# Patient Record
Sex: FEMALE | Race: WHITE | Employment: FULL TIME | URBAN - METROPOLITAN AREA
[De-identification: names, ages, dates, MRNs, and addresses within clinical notes are randomized per-mention and may not be internally consistent; named-entity substitution may affect disease eponyms.]

---

## 2017-03-03 ENCOUNTER — HOSPITAL ENCOUNTER (OUTPATIENT)
Dept: MRI IMAGING | Age: 32
Discharge: HOME | End: 2017-03-03
Attending: NEUROLOGICAL SURGERY
Payer: COMMERCIAL

## 2017-03-03 DIAGNOSIS — M51.26 HNP (HERNIATED NUCLEUS PULPOSUS), LUMBAR: ICD-10-CM

## 2017-03-03 PROCEDURE — 72148 MRI LUMBAR SPINE W/O DYE: CPT

## 2017-08-17 ENCOUNTER — OP HISTORICAL/CONVERTED ENCOUNTER (OUTPATIENT)
Dept: OTHER | Age: 32
End: 2017-08-17

## 2017-09-05 ENCOUNTER — ED HISTORICAL/CONVERTED ENCOUNTER (OUTPATIENT)
Dept: OTHER | Age: 32
End: 2017-09-05

## 2017-09-06 ENCOUNTER — OP HISTORICAL/CONVERTED ENCOUNTER (OUTPATIENT)
Dept: OTHER | Age: 32
End: 2017-09-06

## 2018-07-31 ENCOUNTER — ED HISTORICAL/CONVERTED ENCOUNTER (OUTPATIENT)
Dept: OTHER | Age: 33
End: 2018-07-31

## 2018-08-04 ENCOUNTER — ED HISTORICAL/CONVERTED ENCOUNTER (OUTPATIENT)
Dept: OTHER | Age: 33
End: 2018-08-04

## 2019-01-14 ENCOUNTER — ED HISTORICAL/CONVERTED ENCOUNTER (OUTPATIENT)
Dept: OTHER | Age: 34
End: 2019-01-14

## 2019-11-07 ENCOUNTER — OP HISTORICAL/CONVERTED ENCOUNTER (OUTPATIENT)
Dept: OTHER | Age: 34
End: 2019-11-07

## 2019-11-22 ENCOUNTER — OP HISTORICAL/CONVERTED ENCOUNTER (OUTPATIENT)
Dept: OTHER | Age: 34
End: 2019-11-22

## 2019-11-29 ENCOUNTER — ED HISTORICAL/CONVERTED ENCOUNTER (OUTPATIENT)
Dept: OTHER | Age: 34
End: 2019-11-29

## 2020-01-03 ENCOUNTER — OP HISTORICAL/CONVERTED ENCOUNTER (OUTPATIENT)
Dept: OTHER | Age: 35
End: 2020-01-03

## 2020-01-15 ENCOUNTER — OP HISTORICAL/CONVERTED ENCOUNTER (OUTPATIENT)
Dept: OTHER | Age: 35
End: 2020-01-15

## 2020-05-12 ENCOUNTER — ED HISTORICAL/CONVERTED ENCOUNTER (OUTPATIENT)
Dept: OTHER | Age: 35
End: 2020-05-12

## 2020-08-26 VITALS
WEIGHT: 158 LBS | TEMPERATURE: 98.6 F | HEART RATE: 95 BPM | OXYGEN SATURATION: 96 % | HEIGHT: 63 IN | DIASTOLIC BLOOD PRESSURE: 82 MMHG | SYSTOLIC BLOOD PRESSURE: 124 MMHG | BODY MASS INDEX: 28 KG/M2

## 2020-08-26 PROBLEM — J32.0 CHRONIC MAXILLARY SINUSITIS: Status: ACTIVE | Noted: 2020-08-26

## 2020-08-26 RX ORDER — VALACYCLOVIR HYDROCHLORIDE 500 MG/1
TABLET, FILM COATED ORAL 2 TIMES DAILY
COMMUNITY

## 2020-08-26 RX ORDER — ONDANSETRON 8 MG/1
8 TABLET, ORALLY DISINTEGRATING ORAL
COMMUNITY

## 2020-08-26 RX ORDER — DIAZEPAM 5 MG/1
5 TABLET ORAL
COMMUNITY

## 2020-09-17 ENCOUNTER — OFFICE VISIT (OUTPATIENT)
Dept: PODIATRY | Age: 35
End: 2020-09-17
Payer: COMMERCIAL

## 2020-09-17 VITALS
WEIGHT: 157 LBS | TEMPERATURE: 97.8 F | BODY MASS INDEX: 27.82 KG/M2 | DIASTOLIC BLOOD PRESSURE: 104 MMHG | HEART RATE: 100 BPM | SYSTOLIC BLOOD PRESSURE: 155 MMHG | HEIGHT: 63 IN

## 2020-09-17 DIAGNOSIS — M79.671 RIGHT FOOT PAIN: Primary | ICD-10-CM

## 2020-09-17 PROCEDURE — 99213 OFFICE O/P EST LOW 20 MIN: CPT | Performed by: PODIATRIST

## 2020-09-17 NOTE — LETTER
NOTIFICATION RETURN TO WORK / SCHOOL 
 
9/17/2020 11:57 AM 
 
[facility] Soha Ramirez [address] [facility] [address] [id number] To Whom It May Concern: 
 
Soha Ramirez is currently under the care of Oliver Ramirez. She will return to work/school on: 09/28/2020 If there are questions or concerns please have the patient contact our office. Sincerely, Norma Torres DPM

## 2020-09-22 NOTE — PROGRESS NOTES
Podiatry History and Physical    Subjective:         Patient is a 38 y.o. female who is being seen as a returning patient with an acute complaint of right foot pain. Patient states she recently injured her right foot, this is not the first since her right foot surgery, and is now experiencing 5 out of 10 pain to the right foot. She states that she has had removal of hardware multiple times in the past and believes her body may be rejecting the screw that is in her right foot. She states the pain is sharp and localized to the area. She states weightbearing exacerbates the pain. She denies any local/systemic signs infection.   She denies any other pedal complaints    Past Medical History:   Diagnosis Date    Arrhythmia     TACHYCARDIA    Autoimmune disease ([address]     Chronic pain     Crohn's disease ([address]     Dizziness     Dysautonomia ([address]     GERD (gastroesophageal reflux disease)     Headache     Hyperlipidemia     Hypertriglyceridemia     Irregular heartbeat     Meningitis     MS (multiple sclerosis) (HCC)     Psychiatric disorder     ANXIETY    PUD (peptic ulcer disease)     DUODENAL    Sinus problem      Past Surgical History:   Procedure Laterality Date    HX APPENDECTOMY  1994    HX BREAST LUMPECTOMY Right     HX DILATION AND CURETTAGE  2010    ectopic pregnancy with laproscopy    [facility]    HX GYN  2011    c- section    HX HEENT      SEPTOPLASTY    HX OTHER SURGICAL      eye surgery    HX OTHER SURGICAL      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    HX SEPTOPLASTY      Repair nasal septum defect    HX TONSILLECTOMY  2001    IR PORT REPLACEMENT RT Right     chest       Family History   Problem Relation Age of Onset    Post-op Nausea/Vomiting Sister     Cancer Maternal Uncle         COLON    MS Maternal Grandmother     Cancer Maternal Grandfather         COLON    Stroke Paternal Grandmother     Diabetes Paternal Grandmother     Hypertension Paternal Grandmother     Hypertension Paternal Grandfather     Hypertension Mother     Diabetes Mother     Inflammatory Bowel Dz Mother       Social History     Tobacco Use    Smoking status: Current Every Day Smoker     Packs/day: 0.50     Years: 10.00     Pack years: 5.00    Smokeless tobacco: Never Used   Substance Use Topics    Alcohol use: No     Allergies   Allergen Reactions    Nifedipine Anaphylaxis and Swelling    Codeine Rash    Morphine Hives and Rash     Prior to Admission medications    Medication Sig Start Date End Date Taking? Authorizing Provider   diazePAM (VALIUM) 5 mg tablet Take 5 mg by mouth every six (6) hours as needed for Anxiety. Yes Provider, Historical   ondansetron (ZOFRAN ODT) 8 mg disintegrating tablet Take 8 mg by mouth every eight (8) hours as needed for Nausea or Vomiting. Yes Provider, Historical   valACYclovir (VALTREX) 500 mg tablet Take  by mouth two (2) times a day. Yes Provider, Historical   estradiol (ESTRACE) 2 mg tablet Take  by mouth daily. Yes Provider, Historical   propranolol (INDERAL) 60 mg tablet Take 60 mg by mouth nightly. Yes Provider, Historical   HYDROmorphone (DILAUDID) 2 mg tablet Take 1 Tab by mouth every four (4) hours as needed for Pain. Max Daily Amount: 12 mg. 12/27/16   Toney Skiff, MD   methylPREDNISolone (MEDROL DOSEPACK) 4 mg tablet As directed. 12/27/16   Toney Skiff, MD   traMADol [place] [employer]) 50 mg tablet Take 50 mg by mouth every six (6) hours as needed for Pain. Provider, Historical       Review of Systems   Constitutional: Negative. Eyes: Negative. Respiratory: Negative. Cardiovascular: Negative. Endocrine: Negative. Genitourinary: Negative. Musculoskeletal: Positive for arthralgias. Neurological: Negative. Hematological: Negative. Psychiatric/Behavioral: Positive for dysphoric mood. All other systems reviewed and are negative.       Objective:     Visit Vitals  BP (!) 155/104 (BP 1 Location: Left arm, BP Patient Position: Sitting)   Pulse 100   Temp 97.8 °F (36.6 °C) (Temporal)   Ht 5' 3\" (1.6 m)   Wt 157 lb (71.2 kg)   BMI 27.81 kg/m²       Physical Exam  Vitals signs reviewed. Constitutional:       Appearance: She is overweight. HENT:      Mouth/Throat:      Dentition: Normal dentition. Cardiovascular:      Pulses:           Dorsalis pedis pulses are 2+ on the right side and 2+ on the left side. Posterior tibial pulses are 2+ on the right side and 2+ on the left side. Pulmonary:      Effort: Pulmonary effort is normal.   Musculoskeletal:      Right lower leg: No edema. Left lower leg: No edema. Right foot: Normal range of motion. No deformity, bunion or prominent metatarsal heads. Left foot: Normal range of motion. No deformity, bunion or prominent metatarsal heads. Feet:      Right foot:      Protective Sensation: 10 sites tested. 10 sites sensed. Skin integrity: Skin integrity normal.      Toenail Condition: Right toenails are normal.      Left foot:      Protective Sensation: 10 sites tested. 10 sites sensed. Skin integrity: Skin integrity normal.      Toenail Condition: Left toenails are normal.   Skin:     General: Skin is warm. Capillary Refill: Capillary refill takes 2 to 3 seconds. Neurological:      Mental Status: She is alert and oriented to person, place, and time. Psychiatric:         Mood and Affect: Mood and affect normal.         Behavior: Behavior is cooperative. Data Review: No results found for this or any previous visit (from the past 24 hour(s)). Impression:       ICD-10-CM ICD-9-CM    1.  Right foot pain  M79.671 729.5 XR FOOT RT MIN 3 V         Recommendation:     Patient seen and evaluated in the office  Discussed and educated patient regarding her current medical condition  Gave prescription for x-ray to interrogate the surgical site  Most likely will schedule patient for surgical removal of the orthopedic hardware        RTC in 2 weeks

## 2020-10-07 ENCOUNTER — DOCUMENTATION ONLY (OUTPATIENT)
Dept: PODIATRY | Age: 35
End: 2020-10-07

## 2020-10-09 RX ORDER — PROPRANOLOL HYDROCHLORIDE 20 MG/1
20 TABLET ORAL 3 TIMES DAILY
COMMUNITY

## 2020-10-09 RX ORDER — BUTALBITAL, ACETAMINOPHEN, CAFFEINE AND CODEINE PHOSPHATE 50; 325; 40; 30 MG/1; MG/1; MG/1; MG/1
1 CAPSULE ORAL
COMMUNITY

## 2020-10-12 ENCOUNTER — HOSPITAL ENCOUNTER (OUTPATIENT)
Dept: PREADMISSION TESTING | Age: 35
Discharge: HOME | End: 2020-10-12
Payer: COMMERCIAL

## 2020-10-12 LAB — SARS-COV-2, COV2: NORMAL

## 2020-10-12 PROCEDURE — 87635 SARS-COV-2 COVID-19 AMP PRB: CPT

## 2020-10-14 LAB — SARS-COV-2, COV2NT: NOT DETECTED

## 2020-10-16 ENCOUNTER — ANESTHESIA (OUTPATIENT)
Dept: SURGERY | Age: 35
End: 2020-10-16
Payer: COMMERCIAL

## 2020-10-16 ENCOUNTER — APPOINTMENT (OUTPATIENT)
Dept: GENERAL RADIOLOGY | Age: 35
End: 2020-10-16
Attending: PODIATRIST
Payer: COMMERCIAL

## 2020-10-16 ENCOUNTER — ANESTHESIA EVENT (OUTPATIENT)
Dept: SURGERY | Age: 35
End: 2020-10-16
Payer: COMMERCIAL

## 2020-10-16 ENCOUNTER — HOSPITAL ENCOUNTER (OUTPATIENT)
Age: 35
Setting detail: OUTPATIENT SURGERY
Discharge: HOME | End: 2020-10-16
Attending: PODIATRIST | Admitting: INTERNAL MEDICINE
Payer: COMMERCIAL

## 2020-10-16 VITALS
OXYGEN SATURATION: 100 % | HEIGHT: 63 IN | SYSTOLIC BLOOD PRESSURE: 121 MMHG | WEIGHT: 156 LBS | DIASTOLIC BLOOD PRESSURE: 75 MMHG | BODY MASS INDEX: 27.64 KG/M2 | TEMPERATURE: 97.6 F | RESPIRATION RATE: 18 BRPM | HEART RATE: 96 BPM

## 2020-10-16 DIAGNOSIS — T84.84XA PAINFUL ORTHOPAEDIC HARDWARE (HCC): Primary | ICD-10-CM

## 2020-10-16 PROCEDURE — 74011000250 HC RX REV CODE- 250: Performed by: PODIATRIST

## 2020-10-16 PROCEDURE — 76060000032 HC ANESTHESIA 0.5 TO 1 HR: Performed by: PODIATRIST

## 2020-10-16 PROCEDURE — 76010000138 HC OR TIME 0.5 TO 1 HR: Performed by: PODIATRIST

## 2020-10-16 PROCEDURE — 74011250636 HC RX REV CODE- 250/636: Performed by: ANESTHESIOLOGY

## 2020-10-16 PROCEDURE — 74011250636 HC RX REV CODE- 250/636: Performed by: PODIATRIST

## 2020-10-16 PROCEDURE — 77030041703 HC SLV COMPR DVT ARJO -B: Performed by: PODIATRIST

## 2020-10-16 PROCEDURE — 76210000006 HC OR PH I REC 0.5 TO 1 HR: Performed by: PODIATRIST

## 2020-10-16 PROCEDURE — 77030031139 HC SUT VCRL2 J&J -A: Performed by: PODIATRIST

## 2020-10-16 PROCEDURE — 74011000250 HC RX REV CODE- 250: Performed by: NURSE ANESTHETIST, CERTIFIED REGISTERED

## 2020-10-16 PROCEDURE — 77030002916 HC SUT ETHLN J&J -A: Performed by: PODIATRIST

## 2020-10-16 PROCEDURE — 76210000021 HC REC RM PH II 0.5 TO 1 HR: Performed by: PODIATRIST

## 2020-10-16 PROCEDURE — 74011250637 HC RX REV CODE- 250/637: Performed by: PODIATRIST

## 2020-10-16 PROCEDURE — 73620 X-RAY EXAM OF FOOT: CPT

## 2020-10-16 PROCEDURE — 20680 REMOVAL OF IMPLANT DEEP: CPT | Performed by: PODIATRIST

## 2020-10-16 PROCEDURE — 74011250636 HC RX REV CODE- 250/636: Performed by: NURSE ANESTHETIST, CERTIFIED REGISTERED

## 2020-10-16 PROCEDURE — 2709999900 HC NON-CHARGEABLE SUPPLY: Performed by: PODIATRIST

## 2020-10-16 RX ORDER — SODIUM CHLORIDE 0.9 % (FLUSH) 0.9 %
5-40 SYRINGE (ML) INJECTION EVERY 8 HOURS
Status: DISCONTINUED | OUTPATIENT
Start: 2020-10-16 | End: 2020-10-16 | Stop reason: HOSPADM

## 2020-10-16 RX ORDER — FENTANYL CITRATE 50 UG/ML
INJECTION, SOLUTION INTRAMUSCULAR; INTRAVENOUS AS NEEDED
Status: DISCONTINUED | OUTPATIENT
Start: 2020-10-16 | End: 2020-10-16 | Stop reason: HOSPADM

## 2020-10-16 RX ORDER — PROPOFOL 10 MG/ML
INJECTION, EMULSION INTRAVENOUS AS NEEDED
Status: DISCONTINUED | OUTPATIENT
Start: 2020-10-16 | End: 2020-10-16 | Stop reason: HOSPADM

## 2020-10-16 RX ORDER — SODIUM CHLORIDE 9 MG/ML
1000 INJECTION, SOLUTION INTRAVENOUS CONTINUOUS
Status: DISCONTINUED | OUTPATIENT
Start: 2020-10-16 | End: 2020-10-16 | Stop reason: HOSPADM

## 2020-10-16 RX ORDER — ONDANSETRON 2 MG/ML
4 INJECTION INTRAMUSCULAR; INTRAVENOUS AS NEEDED
Status: DISCONTINUED | OUTPATIENT
Start: 2020-10-16 | End: 2020-10-16 | Stop reason: HOSPADM

## 2020-10-16 RX ORDER — HYDROCODONE BITARTRATE AND ACETAMINOPHEN 5; 325 MG/1; MG/1
1 TABLET ORAL ONCE
Status: COMPLETED | OUTPATIENT
Start: 2020-10-16 | End: 2020-10-16

## 2020-10-16 RX ORDER — HYDROMORPHONE HYDROCHLORIDE 1 MG/ML
0.5 INJECTION, SOLUTION INTRAMUSCULAR; INTRAVENOUS; SUBCUTANEOUS
Status: DISCONTINUED | OUTPATIENT
Start: 2020-10-16 | End: 2020-10-16 | Stop reason: HOSPADM

## 2020-10-16 RX ORDER — LIDOCAINE HYDROCHLORIDE 10 MG/ML
INJECTION INFILTRATION; PERINEURAL AS NEEDED
Status: DISCONTINUED | OUTPATIENT
Start: 2020-10-16 | End: 2020-10-16 | Stop reason: HOSPADM

## 2020-10-16 RX ORDER — FENTANYL CITRATE 50 UG/ML
INJECTION, SOLUTION INTRAMUSCULAR; INTRAVENOUS
Status: DISCONTINUED
Start: 2020-10-16 | End: 2020-10-16 | Stop reason: HOSPADM

## 2020-10-16 RX ORDER — LIDOCAINE HYDROCHLORIDE 20 MG/ML
INJECTION, SOLUTION EPIDURAL; INFILTRATION; INTRACAUDAL; PERINEURAL AS NEEDED
Status: DISCONTINUED | OUTPATIENT
Start: 2020-10-16 | End: 2020-10-16 | Stop reason: HOSPADM

## 2020-10-16 RX ORDER — FENTANYL CITRATE 50 UG/ML
50 INJECTION, SOLUTION INTRAMUSCULAR; INTRAVENOUS
Status: DISCONTINUED | OUTPATIENT
Start: 2020-10-16 | End: 2020-10-16 | Stop reason: HOSPADM

## 2020-10-16 RX ORDER — SODIUM CHLORIDE 0.9 % (FLUSH) 0.9 %
5-40 SYRINGE (ML) INJECTION AS NEEDED
Status: DISCONTINUED | OUTPATIENT
Start: 2020-10-16 | End: 2020-10-16 | Stop reason: HOSPADM

## 2020-10-16 RX ORDER — ONDANSETRON 2 MG/ML
INJECTION INTRAMUSCULAR; INTRAVENOUS AS NEEDED
Status: DISCONTINUED | OUTPATIENT
Start: 2020-10-16 | End: 2020-10-16 | Stop reason: HOSPADM

## 2020-10-16 RX ORDER — DEXAMETHASONE SODIUM PHOSPHATE 4 MG/ML
INJECTION, SOLUTION INTRA-ARTICULAR; INTRALESIONAL; INTRAMUSCULAR; INTRAVENOUS; SOFT TISSUE AS NEEDED
Status: DISCONTINUED | OUTPATIENT
Start: 2020-10-16 | End: 2020-10-16 | Stop reason: HOSPADM

## 2020-10-16 RX ORDER — SODIUM CHLORIDE, SODIUM LACTATE, POTASSIUM CHLORIDE, CALCIUM CHLORIDE 600; 310; 30; 20 MG/100ML; MG/100ML; MG/100ML; MG/100ML
20 INJECTION, SOLUTION INTRAVENOUS CONTINUOUS
Status: DISCONTINUED | OUTPATIENT
Start: 2020-10-16 | End: 2020-10-16 | Stop reason: HOSPADM

## 2020-10-16 RX ORDER — CEFAZOLIN SODIUM 1 G/3ML
INJECTION, POWDER, FOR SOLUTION INTRAMUSCULAR; INTRAVENOUS AS NEEDED
Status: DISCONTINUED | OUTPATIENT
Start: 2020-10-16 | End: 2020-10-16 | Stop reason: HOSPADM

## 2020-10-16 RX ADMIN — MEPERIDINE HYDROCHLORIDE 12.5 MG: 25 INJECTION INTRAMUSCULAR; INTRAVENOUS; SUBCUTANEOUS at 08:50

## 2020-10-16 RX ADMIN — ONDANSETRON 4 MG: 2 INJECTION INTRAMUSCULAR; INTRAVENOUS at 07:47

## 2020-10-16 RX ADMIN — LIDOCAINE HYDROCHLORIDE 100 MG: 20 INJECTION, SOLUTION EPIDURAL; INFILTRATION; INTRACAUDAL; PERINEURAL at 07:39

## 2020-10-16 RX ADMIN — CEFAZOLIN SODIUM 2 G: 1 INJECTION, POWDER, FOR SOLUTION INTRAMUSCULAR; INTRAVENOUS at 07:46

## 2020-10-16 RX ADMIN — HYDROMORPHONE HYDROCHLORIDE 0.5 MG: 1 INJECTION, SOLUTION INTRAMUSCULAR; INTRAVENOUS; SUBCUTANEOUS at 08:42

## 2020-10-16 RX ADMIN — FENTANYL CITRATE 100 MCG: 50 INJECTION, SOLUTION INTRAMUSCULAR; INTRAVENOUS at 07:30

## 2020-10-16 RX ADMIN — PROPOFOL 200 MG: 10 INJECTION, EMULSION INTRAVENOUS at 07:39

## 2020-10-16 RX ADMIN — FENTANYL CITRATE 50 MCG: 50 INJECTION, SOLUTION INTRAMUSCULAR; INTRAVENOUS at 08:30

## 2020-10-16 RX ADMIN — ONDANSETRON 4 MG: 2 INJECTION INTRAMUSCULAR; INTRAVENOUS at 08:36

## 2020-10-16 RX ADMIN — FENTANYL CITRATE 50 MCG: 50 INJECTION, SOLUTION INTRAMUSCULAR; INTRAVENOUS at 08:36

## 2020-10-16 RX ADMIN — SODIUM CHLORIDE, POTASSIUM CHLORIDE, SODIUM LACTATE AND CALCIUM CHLORIDE: 600; 310; 30; 20 INJECTION, SOLUTION INTRAVENOUS at 06:24

## 2020-10-16 RX ADMIN — HYDROMORPHONE HYDROCHLORIDE 0.5 MG: 1 INJECTION, SOLUTION INTRAMUSCULAR; INTRAVENOUS; SUBCUTANEOUS at 08:37

## 2020-10-16 RX ADMIN — HYDROCODONE BITARTRATE AND ACETAMINOPHEN 1 TABLET: 5; 325 TABLET ORAL at 09:42

## 2020-10-16 RX ADMIN — DEXAMETHASONE SODIUM PHOSPHATE 8 MG: 4 INJECTION, SOLUTION INTRA-ARTICULAR; INTRALESIONAL; INTRAMUSCULAR; INTRAVENOUS; SOFT TISSUE at 07:47

## 2020-10-16 NOTE — ANESTHESIA POSTPROCEDURE EVALUATION
Procedure(s):  Removal of Painful Orthopedic Hardware.     general    Anesthesia Post Evaluation      Multimodal analgesia: multimodal analgesia used between 6 hours prior to anesthesia start to PACU discharge  Patient location during evaluation: PACU  Patient participation: complete - patient participated  Level of consciousness: awake  Pain score: 0  Pain management: adequate  Airway patency: patent  Anesthetic complications: no  Cardiovascular status: acceptable  Respiratory status: acceptable  Hydration status: acceptable  Post anesthesia nausea and vomiting:  controlled  Final Post Anesthesia Temperature Assessment:  Normothermia (36.0-37.5 degrees C)      INITIAL Post-op Vital signs:   Vitals Value Taken Time   /89 10/16/2020  8:46 AM   Temp 36.5 °C (97.7 °F) 10/16/2020  8:19 AM   Pulse 101 10/16/2020  8:44 AM   Resp 14 10/16/2020  8:44 AM   SpO2 94 % 10/16/2020  8:44 AM

## 2020-10-16 NOTE — ANESTHESIA PREPROCEDURE EVALUATION
Relevant Problems   No relevant active problems       Anesthetic History     PONV          Review of Systems / Medical History  Patient summary reviewed, nursing notes reviewed and pertinent labs reviewed    Pulmonary  Within defined limits                 Neuro/Psych         Neuromuscular disease, headaches and psychiatric history    Comments: MS Cardiovascular            Dysrhythmias : sinus tachycardia  Hyperlipidemia      Comments: Dysautonomia   GI/Hepatic/Renal     GERD      PUD    Comments: Crohns Endo/Other  Within defined limits           Other Findings            Physical Exam    Airway  Mallampati: I  TM Distance: 4 - 6 cm  Neck ROM: normal range of motion   Mouth opening: Normal     Cardiovascular    Rhythm: regular  Rate: normal         Dental  No notable dental hx       Pulmonary  Breath sounds clear to auscultation               Abdominal  GI exam deferred       Other Findings            Anesthetic Plan    ASA: 2  Anesthesia type: general          Induction: Intravenous  Anesthetic plan and risks discussed with: Patient

## 2020-10-16 NOTE — DISCHARGE INSTRUCTIONS
Patient Education   Patient Education        Learning About Anesthesia  What is anesthesia? Anesthesia controls pain. And it keeps all your organs working normally during surgery or another kind of procedure. Anesthesia can relax you. It can also make you sleepy or forgetful. Or it may make you unconscious. It depends on what kind you get. Your anesthesia provider (anesthesiologist or nurse anesthetist) will make sure you are comfortable and safe during the procedure or surgery. There are different types of anesthesia. · Local anesthesia. This type numbs a small part of the body. Doctors use it for simple procedures. ? You get a shot in the area the doctor will work on.  ? You will feel some pressure during the procedure. ? You may stay awake. Or you may get medicine to help you relax or sleep. · Regional anesthesia. This type blocks pain to a larger area of the body. It can also help relieve pain right after surgery. And it may reduce your need for other pain medicine after surgery. There are different types. They include:  ? Peripheral nerve block. This is a shot near a specific nerve or group of nerves. It blocks pain in the part of the body supplied by the nerve. This is often used for procedures on the hands, arms, feet, legs, or face. ? Epidural and spinal anesthesia. This is a shot near the spinal cord and the nerves around it. It blocks pain from an entire area of the body, such as the belly, hips, or legs. · General anesthesia. This type affects the brain and the whole body. You may get it through a small tube placed in a vein (IV). Or you may breathe it in. You are unconscious and will not feel pain. During the surgery, you will be comfortable. Later, you will not remember much about the surgery. What type will you have? The type of anesthesia you have depends on many things, such as:  · The type of surgery or procedure and the reason you are having it.   · Test results, such as blood tests.  · How worried you feel about the surgery. · Your health. Your doctor and nurses will ask you about any past surgeries. They will ask about any health problems you may have, such as diabetes, lung or heart disease, or a history of stroke. They will want to know if you take medicine, such as blood thinners. Your doctor may also ask if any family members have had any problems with anesthesia. You will talk with your anesthesia provider about your options. In many cases, you may be able to choose the type of anesthesia you have. What are the risks of anesthesia? Major side effects are not common. But all types of anesthesia have some risk. Your risk depends on your overall health. It also depends on the type of anesthesia you have and how you respond to it. Serious but rare risks include breathing problems, heart attack, stroke, and reaction to the medicine. Some health conditions increase the risk of problems. Your anesthesia provider will find out about any health problems you have that may affect your care. Your anesthesia provider will closely watch your vital signs during anesthesia and surgery. This includes checking your blood pressure and heart rate. This may help you avoid problems from anesthesia. What can you do to prepare? You will get a list of instructions to help you prepare. Your doctor will let you know what to expect when you get to the hospital, during the surgery, and after. You will get instructions about when to stop eating and drinking. If you take medicine, you will get instructions about what you can and can't take before surgery. You will be asked to sign a consent form that says you understand the risks of anesthesia. Before you do, your anesthesia provider will talk with you about the best type for you and the risks and benefits of that type. Many people are nervous before they have anesthesia and surgery. Ask your doctor about ways to relax before surgery.  These may include relaxation exercises or medicine. What can you expect after having anesthesia? Right after the surgery, you will be in the recovery room. Nurses will make sure you are comfortable. As the anesthesia wears off, you may feel some pain and discomfort from your surgery. Tell someone if you have pain. Pain medicine works better if you take it before the pain gets bad. You may feel some of the effects of anesthesia for a while. It takes time for the effects of the medicine to completely wear off. · If you had local or regional anesthesia you may feel numb and have less feeling in part of your body. It may also take a few hours for you to be able to move and control your muscles as usual.  · When you first wake up from general anesthesia, you may be confused. Or it may be hard to think clearly. This is normal.  · Don't do anything for 24 hours that requires attention to detail. This includes going to work, making important decisions, or signing any legal documents. Other common side effects of anesthesia include:  · Nausea and vomiting. This does not usually last long. It can be treated with medicine. · A slight drop in body temperature. You may feel cold and shiver when you first wake up. · A sore throat, if you had general anesthesia. · Muscle aches or weakness. · Feeling tired. For minor surgeries, you may go home the same day. For other surgeries you may stay in the hospital. Your doctor will check on your recovery from the anesthesia. He or she will answer any questions you may have. Follow-up care is a key part of your treatment and safety. Be sure to make and go to all appointments, and call your doctor if you are having problems. It's also a good idea to know your test results and keep a list of the medicines you take. Where can you learn more? Go to http://www.gray.com/  Enter V817 in the search box to learn more about \"Learning About Anesthesia. \"  Current as of: August 22, 2019               Content Version: 12.6  © 6477-6679 [employer]. Care instructions adapted under license by [employer] (which disclaims liability or warranty for this information). If you have questions about a medical condition or this instruction, always ask your healthcare professional. [address] any warranty or liability for your use of this information. Infection After Surgery: Care Instructions  Your Care Instructions  After surgery, an infection is always possible. It doesn't mean that the surgery didn't go well. Because an infection can be serious, your doctor has taken steps to manage it. Your doctor checked the infection and cleaned it if necessary. He or she may have made an opening in the area so that the pus can drain out. You may have gauze in the cut so that the area will stay open and keep draining. You may need antibiotics. You will need to follow up with your doctor to make sure the infection has gone away. Follow-up care is a key part of your treatment and safety. Be sure to make and go to all appointments, and call your doctor if you are having problems. It's also a good idea to know your test results and keep a list of the medicines you take. How can you care for yourself at home? · Make sure your surgeon knows that you saw a doctor about the infection. · If your doctor prescribed antibiotics, take them as directed. Do not stop taking them just because you feel better. You need to take the full course of antibiotics. · Ask your doctor if you can take an over-the-counter pain medicine, such as acetaminophen (Tylenol), ibuprofen (Advil, Motrin), or naproxen (Aleve). Be safe with medicines. Read and follow all instructions on the label. · Do not take two or more pain medicines at the same time unless the doctor told you to. Many pain medicines have acetaminophen, which is Tylenol.  Too much acetaminophen (Tylenol) can be harmful. · Prop up the area on a pillow anytime you sit or lie down during the next 3 days. Try to keep it above the level of your heart. This will help reduce swelling. · Keep the skin clean and dry. · If you have a bandage, keep it clean and dry. · You may have a dressing over the cut (incision). A dressing helps the incision heal and protects it. Your doctor will tell you how to take care of this. You can expect drainage from the wound. · If your doctor told you how to care for your incision, follow your doctor's instructions. If you did not get instructions, follow this general advice:  ? Wash around the incision with clean water 2 times a day. Don't use hydrogen peroxide or alcohol, which can slow healing. When should you call for help? Call your doctor now or seek immediate medical care if:    · You have signs that your infection is getting worse, such as:  ? Increased pain, swelling, warmth, or redness in the area. ? Red streaks leading from the area. ? Pus draining from the wound. ? A new or higher fever. Watch closely for changes in your health, and be sure to contact your doctor if you have any problems. Where can you learn more? Go to http://www.gray.com/  Enter C340 in the search box to learn more about \"Infection After Surgery: Care Instructions. \"  Current as of: June 26, 2019               Content Version: 12.6  © 1399-0912 [employer]. Care instructions adapted under license by [employer] (which disclaims liability or warranty for this information). If you have questions about a medical condition or this instruction, always ask your healthcare professional. [address] any warranty or liability for your use of this information.

## 2020-10-16 NOTE — OP NOTES
Operative Report    Patient: Richelle Vidal MRN: [id number]  SSN: [id number]    YOB: 1985  Age: 38 y.o. Sex: female       Date of Surgery:   10/16/2020     Preoperative Diagnosis:   Painful orthopaedic hardware, right foot ([address] [T84.84XA]     Postoperative Diagnosis:   Painful orthopaedic hardware, right foot ([address] [T84.84XA]     Surgeon(s) and Role:     * Laila Barrera DPM - Primary    Anesthesia:   General     Procedure: Procedure(s):  Removal of Painful Orthopedic Hardware, Right Foot    Procedure in Detail:   Pt was seen in the pre-operative holding area and all questions were answered and all concerns were addressed. The operative procedure was discussed in great detail, with all possible complications highlighted. Pt verbalized complete understanding and the consent was signed and witnessed. Pt was given ancef 2g for surgical prophylaxis. The operative limb was marked and the pt was transported to the operating room. The pt was transferred to the operating table and anesthesia was administered as indicated above. The right foot was scrubbed and draped in sterile fashion. A time out was performed to confirm the correct pt, correct procedure, correct limb, abx, allergies, fire risk and attendees within the operating room. A tourniquet was inflated to the right calf at 250mmHg. Upon completion, the procedure commenced. With a #15 blade, a linear incision was made through the previously made incision overlying the right fourth MPJ. Blunt dissection was taken down to the level of the capsule with care taken to avoid vital neurovascular structures. The capsule was sharply incised and the retained orthopedic hardware was located. With a hemostat, the hardware was backed out in its entirety. The surgical site was inspected and no hardware was noted to remain. A thorough flush was performed with a bulb syringe and NS.  Pt previously has a reaction to the absorbable sutures, thus none were utilized for this procedure. The skin was reapproximated with 4.0 nylon. The tourniquet was released and a thorough neurovascular check was performed. A dressing was applied, consisting of betadine ointment, 4x4s, kerlex and light ace wrap. Pt tolerated the above procedures well and all post operative counts were correct. Pt was transferred to PACU without incident. A thorough neurovascular check was then performed. Upon meeting discharge criteria, pt was discharged home to self care. She is to ambulate with the protection of a post op shoe/walking boot for a period of 2-4 weeks. She is to take her oral pain meds prn. She is to keep her dressing C/D/I. A f/u appt was made for (2) weeks in my office. Estimated Blood Loss:    2cc    Tourniquet Time:   Total Tourniquet Time Documented:  Leg (Right) - 15 minutes  Total: Leg (Right) - 15 minutes      Implants:   Implant Name Type Inv.  Item Serial No.  Lot No. LRB No. Used Action   Screw      Right 1 Explanted      Specimens:   No specimens were taken during the procedure    Drains:   No drains were utilized during or after the procedure                Complications:   No complications were encountered throughout the procedure      Signed By:  Miguel Jenkins DPM     October 16, 2020

## 2020-11-02 ENCOUNTER — OFFICE VISIT (OUTPATIENT)
Dept: PODIATRY | Age: 35
End: 2020-11-02
Payer: COMMERCIAL

## 2020-11-02 VITALS
BODY MASS INDEX: 27.93 KG/M2 | DIASTOLIC BLOOD PRESSURE: 86 MMHG | SYSTOLIC BLOOD PRESSURE: 126 MMHG | OXYGEN SATURATION: 99 % | TEMPERATURE: 98 F | HEART RATE: 97 BPM | HEIGHT: 63 IN | WEIGHT: 157.6 LBS

## 2020-11-02 DIAGNOSIS — M79.671 RIGHT FOOT PAIN: Primary | ICD-10-CM

## 2020-11-02 PROCEDURE — 99024 POSTOP FOLLOW-UP VISIT: CPT | Performed by: PODIATRIST

## 2020-11-02 RX ORDER — ASPIRIN 325 MG
TABLET, DELAYED RELEASE (ENTERIC COATED) ORAL
COMMUNITY

## 2020-11-02 RX ORDER — FENOFIBRATE 145 MG/1
TABLET, COATED ORAL DAILY
COMMUNITY

## 2020-11-02 RX ORDER — ROSUVASTATIN CALCIUM 20 MG/1
20 TABLET, COATED ORAL
COMMUNITY

## 2020-11-02 RX ORDER — DOXYCYCLINE HYCLATE 20 MG
20 TABLET ORAL 2 TIMES DAILY
COMMUNITY

## 2020-11-02 NOTE — PROGRESS NOTES
Podiatry History and Physical    Subjective:         Patient is a 38 y.o. female who is being seen as a returning patient status post removal of hardware to the right foot. Patient is 16 days postop. Her incision is intact and she states she is kept her dressing clean/dry/intact. There are no local signs of infection. She denies any systemic signs of infection. She states she did not need any pain medication. She denies any falls. She states she is ambulate with the protection of her postop shoe.   She denies any other lower extremity complaints    Past Medical History:   Diagnosis Date    Arrhythmia     TACHYCARDIA    Autoimmune disease ([address]     Chronic pain     Crohn's disease ([address]     Dizziness     Dysautonomia (HCC)     GERD (gastroesophageal reflux disease)     Headache     Hyperlipidemia     Hypertriglyceridemia     Irregular heartbeat     Meningitis     MS (multiple sclerosis) (HCC)     Psychiatric disorder     ANXIETY    PUD (peptic ulcer disease)     DUODENAL    Sinus problem      Past Surgical History:   Procedure Laterality Date    HX APPENDECTOMY  1994    HX BREAST LUMPECTOMY Right     HX DILATION AND CURETTAGE  2010    ectopic pregnancy with laproscopy    HX GYN  10/2012    TVH    HX GYN  2011    c- section    HX HEENT      SEPTOPLASTY    HX ORTHOPAEDIC      right metatarsal oseotomy    HX OTHER SURGICAL      eye surgery    HX OTHER SURGICAL      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    HX SEPTOPLASTY      Repair nasal septum defect    HX TONSILLECTOMY  2001    HX TOTAL LAPAROSCOPIC HYSTERECTOMY N/A     IR PORT REPLACEMENT RT Right     chest       Family History   Problem Relation Age of Onset    Post-op Nausea/Vomiting Sister     Cancer Maternal Uncle         COLON    MS Maternal Grandmother     Cancer Maternal Grandfather         COLON    Stroke Paternal Grandmother     Diabetes Paternal Grandmother     Hypertension Paternal Grandmother     Hypertension Paternal Grandfather     Hypertension Mother     Diabetes Mother     Inflammatory Bowel Dz Mother       Social History     Tobacco Use    Smoking status: Current Every Day Smoker     Packs/day: 0.50     Years: 10.00     Pack years: 5.00    Smokeless tobacco: Never Used   Substance Use Topics    Alcohol use: No     Allergies   Allergen Reactions    Nifedipine Anaphylaxis and Swelling    Codeine Rash    Morphine Hives and Rash     Prior to Admission medications    Medication Sig Start Date End Date Taking? Authorizing Provider   fenofibrate nanocrystallized (Tricor) 145 mg tablet Take  by mouth daily. Yes Provider, Historical   rosuvastatin (CRESTOR) 20 mg tablet Take 20 mg by mouth nightly. Yes Provider, Historical   cholecalciferol (VITAMIN D3) (50,000 UNITS /1250 MCG) capsule Take  by mouth every seven (7) days. Take by mouth every friday   Yes Provider, Historical   doxycycline (PERIOSTAT) 20 mg tablet Take 20 mg by mouth two (2) times a day. Yes Provider, Historical   propranoloL (INDERAL) 20 mg tablet Take 20 mg by mouth three (3) times daily. Yes Provider, Historical   codeine-butalbital-acetaminophen-caffeine (FIORICET WITH CODEINE) -94-30 mg capsule Take 1 Cap by mouth every four (4) hours as needed for Headache. Yes Provider, Historical   diazePAM (VALIUM) 5 mg tablet Take 5 mg by mouth every six (6) hours as needed for Anxiety. Yes Provider, Historical   ondansetron (ZOFRAN ODT) 8 mg disintegrating tablet Take 8 mg by mouth every eight (8) hours as needed for Nausea or Vomiting. Yes Provider, Historical   valACYclovir (VALTREX) 500 mg tablet Take  by mouth two (2) times a day. Yes Provider, Historical   estradiol (ESTRACE) 2 mg tablet Take  by mouth daily. Yes Provider, Historical       Review of Systems   Constitutional: Negative. Eyes: Negative. Respiratory: Negative. Cardiovascular: Negative. Endocrine: Negative. Genitourinary: Negative.     Musculoskeletal: Positive for arthralgias. Neurological: Negative. Hematological: Negative. Psychiatric/Behavioral: Positive for dysphoric mood. All other systems reviewed and are negative. Objective:     Visit Vitals  /86 (BP 1 Location: Left arm, BP Patient Position: Sitting)   Pulse 97   Temp 98 °F (36.7 °C) (Temporal)   Ht 5' 3\" (1.6 m)   Wt 157 lb 9.6 oz (71.5 kg)   SpO2 99%   BMI 27.92 kg/m²       Physical Exam  Vitals signs reviewed. Constitutional:       Appearance: She is overweight. HENT:      Mouth/Throat:      Dentition: Normal dentition. Cardiovascular:      Pulses:           Dorsalis pedis pulses are 2+ on the right side and 2+ on the left side. Posterior tibial pulses are 2+ on the right side and 2+ on the left side. Pulmonary:      Effort: Pulmonary effort is normal.   Musculoskeletal:      Right lower leg: Edema present. Left lower leg: No edema. Right foot: Normal range of motion. No deformity, bunion or prominent metatarsal heads. Left foot: Normal range of motion. No deformity, bunion or prominent metatarsal heads. Feet:      Right foot:      Protective Sensation: 10 sites tested. 10 sites sensed. Skin integrity: Skin integrity normal.      Toenail Condition: Right toenails are normal.      Left foot:      Protective Sensation: 10 sites tested. 10 sites sensed. Skin integrity: Skin integrity normal.      Toenail Condition: Left toenails are normal.      Comments: Intact surgical incision noted to the right foot with no signs of dehiscence. Sutures noted  Lymphadenopathy:      Lower Body: No right inguinal adenopathy. No left inguinal adenopathy. Skin:     General: Skin is warm. Capillary Refill: Capillary refill takes 2 to 3 seconds. Neurological:      Mental Status: She is alert and oriented to person, place, and time. Psychiatric:         Mood and Affect: Mood and affect normal.         Behavior: Behavior is cooperative.          Data Review: No results found for this or any previous visit (from the past 24 hour(s)). Impression:       ICD-10-CM ICD-9-CM    1.  Right foot pain  M79.671 729.5          Recommendation:     Patient seen and evaluated in the office  Discussed and educated patient regarding her current medical condition  Sutures removed and patient cleared from surgical care

## 2021-09-27 DIAGNOSIS — M25.521 RIGHT ELBOW PAIN: Primary | ICD-10-CM

## 2021-09-29 ENCOUNTER — OFFICE VISIT (OUTPATIENT)
Dept: ORTHOPEDIC SURGERY | Age: 36
End: 2021-09-29
Payer: OTHER MISCELLANEOUS

## 2021-09-29 VITALS — BODY MASS INDEX: 28.35 KG/M2 | HEIGHT: 63 IN | WEIGHT: 160 LBS

## 2021-09-29 DIAGNOSIS — G56.21 LESION OF RIGHT ULNAR NERVE: Primary | ICD-10-CM

## 2021-09-29 PROCEDURE — 99203 OFFICE O/P NEW LOW 30 MIN: CPT | Performed by: ORTHOPAEDIC SURGERY

## 2021-09-29 RX ORDER — MELOXICAM 7.5 MG/1
7.5 TABLET ORAL
COMMUNITY
Start: 2021-09-10

## 2021-09-29 NOTE — PROGRESS NOTES
Name: Robert Malik    : 1985     Service Dept: [address] [place] [address] and Sports Medicine    Patient's Pharmacies:    [address] [place], [place] [address] [facility] ([address]) & [place] [address] [address]  Phone: [phone] Fax: [phone]       Chief Complaint   Patient presents with    Arm Pain        Visit Vitals  Ht 5' 3\" (1.6 m)   Wt 160 lb (72.6 kg)   BMI 28.34 kg/m²      Allergies   Allergen Reactions    Nifedipine Anaphylaxis and Swelling    Codeine Rash    Morphine Hives and Rash      Current Outpatient Medications   Medication Sig Dispense Refill    meloxicam (MOBIC) 7.5 mg tablet Take 7.5 mg by mouth daily as needed.  fenofibrate nanocrystallized (Tricor) 145 mg tablet Take  by mouth daily.  rosuvastatin (CRESTOR) 20 mg tablet Take 20 mg by mouth nightly.  cholecalciferol (VITAMIN D3) (50,000 UNITS /1250 MCG) capsule Take  by mouth every seven (7) days. Take by mouth every friday      doxycycline (PERIOSTAT) 20 mg tablet Take 20 mg by mouth two (2) times a day.  propranoloL (INDERAL) 20 mg tablet Take 20 mg by mouth three (3) times daily.  codeine-butalbital-acetaminophen-caffeine (FIORICET WITH CODEINE) -58-30 mg capsule Take 1 Cap by mouth every four (4) hours as needed for Headache.  diazePAM (VALIUM) 5 mg tablet Take 5 mg by mouth every six (6) hours as needed for Anxiety.  ondansetron (ZOFRAN ODT) 8 mg disintegrating tablet Take 8 mg by mouth every eight (8) hours as needed for Nausea or Vomiting.  valACYclovir (VALTREX) 500 mg tablet Take  by mouth two (2) times a day.  estradiol (ESTRACE) 2 mg tablet Take  by mouth daily.         Patient Active Problem List   Diagnosis Code    Chronic maxillary sinusitis J32.0    Right foot pain M79.671      Family History   Problem Relation Age of Onset    Post-op Nausea/Vomiting Sister     Cancer Maternal Uncle COLON    MS Maternal Grandmother     Cancer Maternal Grandfather         COLON    Stroke Paternal Grandmother     Diabetes Paternal Grandmother     Hypertension Paternal Grandmother     Hypertension Paternal Grandfather     Hypertension Mother     Diabetes Mother     Inflammatory Bowel Dz Mother       Social History     Socioeconomic History    Marital status:      Spouse name: Not on file    Number of children: Not on file    Years of education: Not on file    Highest education level: Not on file   Tobacco Use    Smoking status: Current Every Day Smoker     Packs/day: 0.50     Years: 10.00     Pack years: 5.00    Smokeless tobacco: Never Used   Substance and Sexual Activity    Alcohol use: No    Drug use: No    Sexual activity: Yes     Social Determinants of Health     Financial Resource Strain:     Difficulty of Paying Living Expenses:    Food Insecurity:     Worried About Running Out of Food in the Last Year:     Ran Out of Food in the Last Year:    Transportation Needs:     Lack of Transportation (Medical):      Lack of Transportation (Non-Medical):    Physical Activity:     Days of Exercise per Week:     Minutes of Exercise per Session:    Stress:     Feeling of Stress :    Social Connections:     Frequency of Communication with Friends and Family:     Frequency of Social Gatherings with Friends and Family:     Attends Caodaism Services:     Active Member of Clubs or Organizations:     Attends Club or Organization Meetings:     Marital Status:       Past Surgical History:   Procedure Laterality Date    HX APPENDECTOMY  1994    HX BREAST LUMPECTOMY Right     HX DILATION AND CURETTAGE  2010    ectopic pregnancy with laproscopy    HX GYN  10/2012    TVH    HX GYN  2011    c- section    HX HEENT      SEPTOPLASTY    HX ORTHOPAEDIC      right metatarsal oseotomy    HX OTHER SURGICAL      eye surgery    HX OTHER SURGICAL      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    HX SEPTOPLASTY      Repair nasal septum defect    HX TONSILLECTOMY  2001    HX TOTAL LAPAROSCOPIC HYSTERECTOMY N/A     IR PORT REPLACEMENT RT Right     chest      Past Medical History:   Diagnosis Date    Arrhythmia     TACHYCARDIA    Autoimmune disease ([address]     Chronic pain     Crohn's disease ([address]     Dizziness     Dysautonomia ([address]     GERD (gastroesophageal reflux disease)     Headache     Hyperlipidemia     Hypertriglyceridemia     Irregular heartbeat     Meningitis     MS (multiple sclerosis) (HCC)     Psychiatric disorder     ANXIETY    PUD (peptic ulcer disease)     DUODENAL    Sinus problem         I have reviewed and agree with PFSH and ROS and intake form in chart and the record furthermore I have reviewed prior medical record(s) regarding this patients care during this appointment. Review of Systems:   Patient is a pleasant appearing individual, appropriately dressed, well hydrated, well nourished, who is alert, appropriately oriented for age, and in no acute distress with a normal gait and normal affect who does not appear to be in any significant pain. Physical Exam:  Right Hand - Negative Carpal Compression test, Full Range of motion of the wrist, No Instability, Positive for numbness extending from the elbow to the 4th and 5th digit, Mild swelling, Decreased  strength, Positive for muscle atrophy, Good cap refill. Left Hand - Negative for Carpal Compression test, Ful Range of motion of the wrist, No Instability, No Numbness, No Swelling, No Weakness, No Muscle atrophy, Good cap refill. Encounter Diagnoses     ICD-10-CM ICD-9-CM   1. Lesion of right ulnar nerve  G56.21 354.2       HPI:  The patient is here with a chief complaint of right elbow pain, throbbing, burning pain. Numbness is the main problem. Pain is 7/10. X-rays are unremarkable. Assessment/Plan:  1. Right upper extremity possible ulnar nerve entrapment.     I would like to get an EMG, also 10-pound restriction lifting in the meantime and go from there. As part of continued conservative pain management options the patient was advised to utilize Tylenol or OTC NSAIDS as long as it is not medically contraindicated. Return to Office: Follow-up and Dispositions    · Return for POST EMG. Scribed by Juni Faith as dictated by Piotr Hernandez MD.  Documentation True and Accepted Steve Hernandez MD

## 2021-09-29 NOTE — PATIENT INSTRUCTIONS
Ulnar Neuropathy (Handlebar Palsy): Exercises  Introduction  Here are some examples of exercises for you to try. The exercises may be suggested for a condition or for rehabilitation. Start each exercise slowly. Ease off the exercises if you start to have pain. You will be told when to start these exercises and which ones will work best for you. How to do the exercises  Neck rotation    1. Sit in a firm chair, or stand up straight. 2. Keeping your chin level, turn your head to the right, and hold for 15 to 30 seconds. 3. Turn your head to the left, and hold for 15 to 30 seconds. 4. Repeat 2 to 4 times to each side. Shoulder blade squeeze    1. While standing with your arms at your sides, squeeze your shoulder blades together. Do not raise your shoulders as you are squeezing. 2. Hold for 6 seconds. 3. Repeat 8 to 12 times. Neck stretches    1. Look straight ahead, and tip your right ear to your right shoulder. Do not let your left shoulder rise as you tip your head to the right. 2. Hold for 15 to 30 seconds. 3. Tilt your head to the left. Do not let your right shoulder rise as you tip your head to the left. 4. Hold for 15 to 30 seconds. 5. Repeat 2 to 4 times to each side. Elbow flexion and extension    If this exercise causes numbness, tingling, or pain in your hand, ease off of the stretch. You should not have symptoms as you stretch. If you cannot back off enough so that you can do the exercise without symptoms, stop doing the exercise right away. 1. Stand with your arms relaxed at your sides. 2. With your affected arm, gently bend your elbow up toward you as far as possible. 3. Then straighten your arm as much as you can. 4. Repeat 2 to 4 times. Wrist flexor stretch    If this exercise causes numbness, tingling, or pain in your hand, ease off of the stretch. You should not have symptoms as you stretch.  If you cannot back off enough so that you can do the exercise without symptoms, stop doing the exercise right away. 1. Extend your affected arm in front of you with your palm facing away from your body. 2. Bend back your wrist on your affected arm, pointing your hand up toward the ceiling. 3. With your other hand, gently bend your wrist farther until you feel a mild to moderate stretch in your forearm. 4. Hold for at least 15 to 30 seconds. 5. Repeat 2 to 4 times. 6. Repeat steps 1 through 5, but this time extend your affected arm in front of you with your palm facing up. Then bend back your wrist, pointing your hand toward the floor. Wrist flexion and extension    If this exercise causes numbness, tingling, or pain in your hand, ease off of the stretch. You should not have symptoms as you stretch. If you cannot back off enough so that you can do the exercise without symptoms, stop doing the exercise right away. 1. Place your forearm on a table, with your affected hand and wrist extended beyond the table, palm down. 2. Slowly bend your wrist to move your hand upward and allow your hand to close into a fist. Hold for about 6 seconds. 3. Then lower your hand and allow your fingers to relax. Hold this position for about 6 seconds. You should feel a gentle stretch. 4. Repeat 8 to 12 times. Follow-up care is a key part of your treatment and safety. Be sure to make and go to all appointments, and call your doctor if you are having problems. It's also a good idea to know your test results and keep a list of the medicines you take. Where can you learn more? Go to http://www.gray.com/  Enter T953 in the search box to learn more about \"Ulnar Neuropathy (Handlebar Palsy): Exercises. \"  Current as of: July 1, 2021               Content Version: 13.0  © 5378-7426 [employer]wise, [employer]. Care instructions adapted under license by [employer] (which disclaims liability or warranty for this information).  If you have questions about a medical condition or this instruction, always ask your healthcare professional. [place] [address] any warranty or liability for your use of this information.

## 2021-09-29 NOTE — LETTER
9/30/2021    Patient: Robert Malik   YOB: 1985   Date of Visit: 9/29/2021     Rehan Sprague MD  [address] [address]  [facility]    Dear Rehan Sprague MD,      Thank you for referring Ms. Bennett Willoughby to [address] [place] [address] AND SPORTS [facility] for evaluation. My notes for this consultation are attached. If you have questions, please do not hesitate to call me. I look forward to following your patient along with you.       Sincerely,    Yocasta Paris MD

## 2022-03-19 PROBLEM — J32.0 CHRONIC MAXILLARY SINUSITIS: Status: ACTIVE | Noted: 2020-08-26

## 2022-03-19 PROBLEM — M79.671 RIGHT FOOT PAIN: Status: ACTIVE | Noted: 2020-09-17

## 2024-12-11 ENCOUNTER — TRANSCRIBE ORDERS (OUTPATIENT)
Facility: HOSPITAL | Age: 39
End: 2024-12-11

## 2024-12-11 DIAGNOSIS — G37.9 DEMYELINATING DISEASE (HCC): Primary | ICD-10-CM

## 2024-12-11 DIAGNOSIS — R26.2 DIFFICULTY WALKING: ICD-10-CM

## 2024-12-11 DIAGNOSIS — G37.3 TRANSVERSE MYELITIS (HCC): ICD-10-CM

## 2024-12-11 DIAGNOSIS — R20.0 LT ARM NUMBNESS: ICD-10-CM

## 2024-12-11 DIAGNOSIS — Z86.69 H/O: IRITIS: ICD-10-CM

## 2024-12-20 ENCOUNTER — ANESTHESIA EVENT (OUTPATIENT)
Facility: HOSPITAL | Age: 39
End: 2024-12-20
Payer: COMMERCIAL

## 2024-12-20 ENCOUNTER — HOSPITAL ENCOUNTER (EMERGENCY)
Facility: HOSPITAL | Age: 39
Discharge: ANOTHER HOSPITAL | End: 2024-12-20
Attending: STUDENT IN AN ORGANIZED HEALTH CARE EDUCATION/TRAINING PROGRAM
Payer: COMMERCIAL

## 2024-12-20 ENCOUNTER — APPOINTMENT (OUTPATIENT)
Facility: HOSPITAL | Age: 39
End: 2024-12-20
Payer: COMMERCIAL

## 2024-12-20 ENCOUNTER — ANESTHESIA (OUTPATIENT)
Facility: HOSPITAL | Age: 39
End: 2024-12-20
Payer: COMMERCIAL

## 2024-12-20 ENCOUNTER — APPOINTMENT (OUTPATIENT)
Facility: HOSPITAL | Age: 39
DRG: 024 | End: 2024-12-20
Payer: COMMERCIAL

## 2024-12-20 ENCOUNTER — HOSPITAL ENCOUNTER (EMERGENCY)
Facility: HOSPITAL | Age: 39
Discharge: HOME | DRG: 024 | End: 2024-12-23
Attending: RADIOLOGY
Payer: COMMERCIAL

## 2024-12-20 ENCOUNTER — HOSPITAL ENCOUNTER (INPATIENT)
Facility: HOSPITAL | Age: 39
LOS: 2 days | Discharge: HOME | DRG: 024 | End: 2024-12-22
Attending: INTERNAL MEDICINE | Admitting: INTERNAL MEDICINE
Payer: COMMERCIAL

## 2024-12-20 VITALS
TEMPERATURE: 98.4 F | HEART RATE: 115 BPM | WEIGHT: 154.1 LBS | HEIGHT: 63 IN | DIASTOLIC BLOOD PRESSURE: 107 MMHG | OXYGEN SATURATION: 99 % | BODY MASS INDEX: 27.3 KG/M2 | SYSTOLIC BLOOD PRESSURE: 197 MMHG | RESPIRATION RATE: 17 BRPM

## 2024-12-20 VITALS
SYSTOLIC BLOOD PRESSURE: 94 MMHG | HEART RATE: 109 BPM | OXYGEN SATURATION: 98 % | RESPIRATION RATE: 12 BRPM | DIASTOLIC BLOOD PRESSURE: 74 MMHG

## 2024-12-20 DIAGNOSIS — I63.511 CEREBROVASCULAR ACCIDENT (CVA) DUE TO OCCLUSION OF RIGHT MIDDLE CEREBRAL ARTERY (HCC): Primary | ICD-10-CM

## 2024-12-20 DIAGNOSIS — I65.21 ICAO (INTERNAL CAROTID ARTERY OCCLUSION), RIGHT: Primary | ICD-10-CM

## 2024-12-20 PROBLEM — I63.9 ISCHEMIC STROKE (HCC): Status: ACTIVE | Noted: 2024-12-20

## 2024-12-20 PROBLEM — I63.411 CEREBROVASCULAR ACCIDENT (CVA) DUE TO EMBOLISM OF RIGHT MIDDLE CEREBRAL ARTERY (HCC): Status: ACTIVE | Noted: 2024-12-20

## 2024-12-20 LAB
ACT BLD: 164 SECS (ref 79–138)
ACT BLD: 181 SECS (ref 79–138)
ACT BLD: 204 SECS (ref 79–138)
ACT BLD: 239 SECS (ref 79–138)
ACT BLD: 279 SECS (ref 79–138)
ALBUMIN SERPL-MCNC: 4.5 G/DL (ref 3.5–5.2)
ALBUMIN/GLOB SERPL: 1.6 (ref 1.1–2.2)
ALP SERPL-CCNC: 58 U/L (ref 35–104)
ALT SERPL-CCNC: 13 U/L (ref 10–35)
ANION GAP SERPL CALC-SCNC: 13 MMOL/L (ref 2–12)
ANION GAP SERPL CALC-SCNC: 6 MMOL/L (ref 2–12)
APTT PPP: >130 SEC (ref 22.1–31)
AST SERPL-CCNC: 18 U/L (ref 10–35)
BASOPHILS # BLD: 0 K/UL (ref 0–0.1)
BASOPHILS NFR BLD: 0 % (ref 0–1)
BILIRUB SERPL-MCNC: 0.2 MG/DL (ref 0.2–1)
BUN SERPL-MCNC: 10 MG/DL (ref 6–20)
BUN SERPL-MCNC: 7 MG/DL (ref 6–20)
BUN/CREAT SERPL: 13 (ref 12–20)
BUN/CREAT SERPL: 18 (ref 12–20)
CALCIUM SERPL-MCNC: 7.3 MG/DL (ref 8.5–10.1)
CALCIUM SERPL-MCNC: 9.2 MG/DL (ref 8.6–10)
CHLORIDE SERPL-SCNC: 100 MMOL/L (ref 98–107)
CHLORIDE SERPL-SCNC: 113 MMOL/L (ref 97–108)
CO2 SERPL-SCNC: 23 MMOL/L (ref 21–32)
CO2 SERPL-SCNC: 25 MMOL/L (ref 22–29)
CREAT SERPL-MCNC: 0.52 MG/DL (ref 0.55–1.02)
CREAT SERPL-MCNC: 0.56 MG/DL (ref 0.5–0.9)
DIFFERENTIAL METHOD BLD: ABNORMAL
EKG ATRIAL RATE: 118 BPM
EKG DIAGNOSIS: NORMAL
EKG P AXIS: 69 DEGREES
EKG P-R INTERVAL: 134 MS
EKG Q-T INTERVAL: 340 MS
EKG QRS DURATION: 86 MS
EKG QTC CALCULATION (BAZETT): 476 MS
EKG R AXIS: 92 DEGREES
EKG T AXIS: 46 DEGREES
EKG VENTRICULAR RATE: 118 BPM
EOSINOPHIL # BLD: 0.2 K/UL (ref 0–0.4)
EOSINOPHIL NFR BLD: 1 % (ref 0–7)
ERYTHROCYTE [DISTWIDTH] IN BLOOD BY AUTOMATED COUNT: 12.5 % (ref 11.5–14.5)
ERYTHROCYTE [DISTWIDTH] IN BLOOD BY AUTOMATED COUNT: 12.5 % (ref 11.5–14.5)
GLOBULIN SER CALC-MCNC: 2.9 G/DL (ref 2–4)
GLUCOSE BLD STRIP.AUTO-MCNC: 103 MG/DL (ref 65–117)
GLUCOSE SERPL-MCNC: 100 MG/DL (ref 65–100)
GLUCOSE SERPL-MCNC: 89 MG/DL (ref 65–100)
HCT VFR BLD AUTO: 29.2 % (ref 35–47)
HCT VFR BLD AUTO: 38.7 % (ref 35–47)
HGB BLD-MCNC: 10.2 G/DL (ref 11.5–16)
HGB BLD-MCNC: 13.6 G/DL (ref 11.5–16)
IMM GRANULOCYTES # BLD AUTO: 0 K/UL
IMM GRANULOCYTES NFR BLD AUTO: 0 %
INR PPP: 1 (ref 0.9–1.1)
INR PPP: 1.2 (ref 0.9–1.1)
LYMPHOCYTES # BLD: 5.5 K/UL (ref 0.8–3.5)
LYMPHOCYTES NFR BLD: 35 % (ref 12–49)
MCH RBC QN AUTO: 32.8 PG (ref 26–34)
MCH RBC QN AUTO: 32.8 PG (ref 26–34)
MCHC RBC AUTO-ENTMCNC: 34.9 G/DL (ref 30–36.5)
MCHC RBC AUTO-ENTMCNC: 35.1 G/DL (ref 30–36.5)
MCV RBC AUTO: 93.3 FL (ref 80–99)
MCV RBC AUTO: 93.9 FL (ref 80–99)
MONOCYTES # BLD: 0.9 K/UL (ref 0–1)
MONOCYTES NFR BLD: 6 % (ref 5–13)
NEUTS SEG # BLD: 9 K/UL (ref 1.8–8)
NEUTS SEG NFR BLD: 58 % (ref 32–75)
NRBC # BLD: 0 K/UL (ref 0–0.01)
NRBC # BLD: 0 K/UL (ref 0–0.01)
NRBC BLD-RTO: 0 PER 100 WBC
NRBC BLD-RTO: 0 PER 100 WBC
PLATELET # BLD AUTO: 149 K/UL (ref 150–400)
PLATELET # BLD AUTO: 206 K/UL (ref 150–400)
PMV BLD AUTO: 12.2 FL (ref 8.9–12.9)
PMV BLD AUTO: 12.3 FL (ref 8.9–12.9)
POTASSIUM SERPL-SCNC: 3.6 MMOL/L (ref 3.5–5.1)
POTASSIUM SERPL-SCNC: 3.8 MMOL/L (ref 3.5–5.1)
PROT SERPL-MCNC: 7.4 G/DL (ref 6.4–8.3)
PROTHROMBIN TIME: 12.1 SEC (ref 9–11.1)
PROTHROMBIN TIME: 13.3 SEC (ref 11.9–14.6)
RBC # BLD AUTO: 3.11 M/UL (ref 3.8–5.2)
RBC # BLD AUTO: 4.15 M/UL (ref 3.8–5.2)
RBC MORPH BLD: ABNORMAL
SERVICE CMNT-IMP: NORMAL
SODIUM SERPL-SCNC: 138 MMOL/L (ref 136–145)
SODIUM SERPL-SCNC: 142 MMOL/L (ref 136–145)
THERAPEUTIC RANGE: ABNORMAL SECS (ref 58–77)
TROPONIN T SERPL HS-MCNC: <6 NG/L (ref 0–14)
UFH PPP CHRO-ACNC: >1.5 IU/ML
WBC # BLD AUTO: 12.6 K/UL (ref 3.6–11)
WBC # BLD AUTO: 15.6 K/UL (ref 3.6–11)
WBC MORPH BLD: ABNORMAL

## 2024-12-20 PROCEDURE — 36620 INSERTION CATHETER ARTERY: CPT | Performed by: ANESTHESIOLOGY

## 2024-12-20 PROCEDURE — 2580000003 HC RX 258: Performed by: INTERNAL MEDICINE

## 2024-12-20 PROCEDURE — 6360000002 HC RX W HCPCS: Performed by: NURSE ANESTHETIST, CERTIFIED REGISTERED

## 2024-12-20 PROCEDURE — 3E063PZ INTRODUCTION OF PLATELET INHIBITOR INTO CENTRAL ARTERY, PERCUTANEOUS APPROACH: ICD-10-PCS | Performed by: RADIOLOGY

## 2024-12-20 PROCEDURE — 85027 COMPLETE CBC AUTOMATED: CPT

## 2024-12-20 PROCEDURE — 3700000000 HC ANESTHESIA ATTENDED CARE: Performed by: RADIOLOGY

## 2024-12-20 PROCEDURE — 2500000003 HC RX 250 WO HCPCS: Performed by: NURSE ANESTHETIST, CERTIFIED REGISTERED

## 2024-12-20 PROCEDURE — 6360000002 HC RX W HCPCS: Performed by: NURSE PRACTITIONER

## 2024-12-20 PROCEDURE — 6360000002 HC RX W HCPCS: Performed by: INTERNAL MEDICINE

## 2024-12-20 PROCEDURE — 6360000004 HC RX CONTRAST MEDICATION: Performed by: RADIOLOGY

## 2024-12-20 PROCEDURE — 6370000000 HC RX 637 (ALT 250 FOR IP): Performed by: RADIOLOGY

## 2024-12-20 PROCEDURE — 6370000000 HC RX 637 (ALT 250 FOR IP): Performed by: NURSE PRACTITIONER

## 2024-12-20 PROCEDURE — 80048 BASIC METABOLIC PNL TOTAL CA: CPT

## 2024-12-20 PROCEDURE — 6360000004 HC RX CONTRAST MEDICATION: Performed by: STUDENT IN AN ORGANIZED HEALTH CARE EDUCATION/TRAINING PROGRAM

## 2024-12-20 PROCEDURE — 6370000000 HC RX 637 (ALT 250 FOR IP): Performed by: INTERNAL MEDICINE

## 2024-12-20 PROCEDURE — 84484 ASSAY OF TROPONIN QUANT: CPT

## 2024-12-20 PROCEDURE — 93005 ELECTROCARDIOGRAM TRACING: CPT | Performed by: STUDENT IN AN ORGANIZED HEALTH CARE EDUCATION/TRAINING PROGRAM

## 2024-12-20 PROCEDURE — 2580000003 HC RX 258: Performed by: RADIOLOGY

## 2024-12-20 PROCEDURE — 03CG3ZZ EXTIRPATION OF MATTER FROM INTRACRANIAL ARTERY, PERCUTANEOUS APPROACH: ICD-10-PCS | Performed by: RADIOLOGY

## 2024-12-20 PROCEDURE — 70498 CT ANGIOGRAPHY NECK: CPT

## 2024-12-20 PROCEDURE — 93010 ELECTROCARDIOGRAM REPORT: CPT | Performed by: SPECIALIST

## 2024-12-20 PROCEDURE — 80053 COMPREHEN METABOLIC PANEL: CPT

## 2024-12-20 PROCEDURE — 3700000001 HC ADD 15 MINUTES (ANESTHESIA): Performed by: RADIOLOGY

## 2024-12-20 PROCEDURE — 2000000000 HC ICU R&B

## 2024-12-20 PROCEDURE — 51798 US URINE CAPACITY MEASURE: CPT

## 2024-12-20 PROCEDURE — 85520 HEPARIN ASSAY: CPT

## 2024-12-20 PROCEDURE — 6360000002 HC RX W HCPCS: Performed by: STUDENT IN AN ORGANIZED HEALTH CARE EDUCATION/TRAINING PROGRAM

## 2024-12-20 PROCEDURE — 70450 CT HEAD/BRAIN W/O DYE: CPT

## 2024-12-20 PROCEDURE — APPNB45 APP NON BILLABLE 31-45 MINUTES

## 2024-12-20 PROCEDURE — 85347 COAGULATION TIME ACTIVATED: CPT

## 2024-12-20 PROCEDURE — C1713 ANCHOR/SCREW BN/BN,TIS/BN: HCPCS

## 2024-12-20 PROCEDURE — 61645 PERQ ART M-THROMBECT &/NFS: CPT | Performed by: RADIOLOGY

## 2024-12-20 PROCEDURE — 85610 PROTHROMBIN TIME: CPT

## 2024-12-20 PROCEDURE — C1769 GUIDE WIRE: HCPCS

## 2024-12-20 PROCEDURE — 037K34Z DILATION OF RIGHT INTERNAL CAROTID ARTERY WITH DRUG-ELUTING INTRALUMINAL DEVICE, PERCUTANEOUS APPROACH: ICD-10-PCS | Performed by: RADIOLOGY

## 2024-12-20 PROCEDURE — 37216 TRANSCATH STENT CCA W/O EPS: CPT | Performed by: RADIOLOGY

## 2024-12-20 PROCEDURE — 2580000003 HC RX 258: Performed by: NURSE PRACTITIONER

## 2024-12-20 PROCEDURE — 82962 GLUCOSE BLOOD TEST: CPT

## 2024-12-20 PROCEDURE — 99285 EMERGENCY DEPT VISIT HI MDM: CPT

## 2024-12-20 PROCEDURE — 037H3ZZ DILATION OF RIGHT COMMON CAROTID ARTERY, PERCUTANEOUS APPROACH: ICD-10-PCS | Performed by: RADIOLOGY

## 2024-12-20 PROCEDURE — 36415 COLL VENOUS BLD VENIPUNCTURE: CPT

## 2024-12-20 PROCEDURE — 85730 THROMBOPLASTIN TIME PARTIAL: CPT

## 2024-12-20 PROCEDURE — 2500000003 HC RX 250 WO HCPCS: Performed by: NURSE PRACTITIONER

## 2024-12-20 PROCEDURE — 96374 THER/PROPH/DIAG INJ IV PUSH: CPT

## 2024-12-20 PROCEDURE — 6360000002 HC RX W HCPCS: Performed by: RADIOLOGY

## 2024-12-20 PROCEDURE — 4500000002 HC ER NO CHARGE

## 2024-12-20 PROCEDURE — B3161ZZ FLUOROSCOPY OF RIGHT INTERNAL CAROTID ARTERY USING LOW OSMOLAR CONTRAST: ICD-10-PCS | Performed by: RADIOLOGY

## 2024-12-20 PROCEDURE — 85025 COMPLETE CBC W/AUTO DIFF WBC: CPT

## 2024-12-20 PROCEDURE — 6370000000 HC RX 637 (ALT 250 FOR IP): Performed by: PHYSICIAN ASSISTANT

## 2024-12-20 PROCEDURE — B31R1ZZ FLUOROSCOPY OF INTRACRANIAL ARTERIES USING LOW OSMOLAR CONTRAST: ICD-10-PCS | Performed by: RADIOLOGY

## 2024-12-20 PROCEDURE — 2580000003 HC RX 258: Performed by: NURSE ANESTHETIST, CERTIFIED REGISTERED

## 2024-12-20 PROCEDURE — 0042T CT BRAIN PERFUSION: CPT

## 2024-12-20 RX ORDER — SODIUM CHLORIDE 0.9 % (FLUSH) 0.9 %
5-40 SYRINGE (ML) INJECTION PRN
Status: DISCONTINUED | OUTPATIENT
Start: 2024-12-20 | End: 2024-12-22 | Stop reason: HOSPADM

## 2024-12-20 RX ORDER — LABETALOL HYDROCHLORIDE 5 MG/ML
20 INJECTION, SOLUTION INTRAVENOUS ONCE
Status: COMPLETED | OUTPATIENT
Start: 2024-12-20 | End: 2024-12-20

## 2024-12-20 RX ORDER — SODIUM CHLORIDE 9 MG/ML
INJECTION, SOLUTION INTRAVENOUS PRN
Status: DISCONTINUED | OUTPATIENT
Start: 2024-12-20 | End: 2024-12-22 | Stop reason: HOSPADM

## 2024-12-20 RX ORDER — VASOPRESSIN 20 [USP'U]/ML
INJECTION, SOLUTION INTRAVENOUS
Status: DISCONTINUED | OUTPATIENT
Start: 2024-12-20 | End: 2024-12-20 | Stop reason: SDUPTHER

## 2024-12-20 RX ORDER — LIDOCAINE HYDROCHLORIDE 20 MG/ML
INJECTION, SOLUTION EPIDURAL; INFILTRATION; INTRACAUDAL; PERINEURAL
Status: DISCONTINUED | OUTPATIENT
Start: 2024-12-20 | End: 2024-12-20 | Stop reason: SDUPTHER

## 2024-12-20 RX ORDER — ROCURONIUM BROMIDE 10 MG/ML
INJECTION, SOLUTION INTRAVENOUS
Status: DISCONTINUED | OUTPATIENT
Start: 2024-12-20 | End: 2024-12-20 | Stop reason: SDUPTHER

## 2024-12-20 RX ORDER — HEPARIN SODIUM 1000 [USP'U]/ML
INJECTION, SOLUTION INTRAVENOUS; SUBCUTANEOUS
Status: DISCONTINUED | OUTPATIENT
Start: 2024-12-20 | End: 2024-12-20 | Stop reason: SDUPTHER

## 2024-12-20 RX ORDER — SODIUM CHLORIDE 9 MG/ML
INJECTION, SOLUTION INTRAVENOUS CONTINUOUS
Status: DISCONTINUED | OUTPATIENT
Start: 2024-12-20 | End: 2024-12-21

## 2024-12-20 RX ORDER — SODIUM CHLORIDE 0.9 % (FLUSH) 0.9 %
5-40 SYRINGE (ML) INJECTION EVERY 12 HOURS SCHEDULED
Status: DISCONTINUED | OUTPATIENT
Start: 2024-12-20 | End: 2024-12-22 | Stop reason: HOSPADM

## 2024-12-20 RX ORDER — VALACYCLOVIR HYDROCHLORIDE 500 MG/1
500 TABLET, FILM COATED ORAL NIGHTLY
Status: DISCONTINUED | OUTPATIENT
Start: 2024-12-20 | End: 2024-12-22 | Stop reason: HOSPADM

## 2024-12-20 RX ORDER — SODIUM CHLORIDE, SODIUM LACTATE, POTASSIUM CHLORIDE, CALCIUM CHLORIDE 600; 310; 30; 20 MG/100ML; MG/100ML; MG/100ML; MG/100ML
INJECTION, SOLUTION INTRAVENOUS
Status: DISCONTINUED | OUTPATIENT
Start: 2024-12-20 | End: 2024-12-20 | Stop reason: SDUPTHER

## 2024-12-20 RX ORDER — HEPARIN SODIUM 10000 [USP'U]/100ML
5-30 INJECTION, SOLUTION INTRAVENOUS CONTINUOUS
Status: DISCONTINUED | OUTPATIENT
Start: 2024-12-20 | End: 2024-12-20

## 2024-12-20 RX ORDER — LABETALOL HYDROCHLORIDE 5 MG/ML
10 INJECTION, SOLUTION INTRAVENOUS EVERY 10 MIN PRN
Status: DISCONTINUED | OUTPATIENT
Start: 2024-12-20 | End: 2024-12-22 | Stop reason: HOSPADM

## 2024-12-20 RX ORDER — ONDANSETRON 4 MG/1
4 TABLET, ORALLY DISINTEGRATING ORAL EVERY 8 HOURS PRN
Status: DISCONTINUED | OUTPATIENT
Start: 2024-12-20 | End: 2024-12-22 | Stop reason: HOSPADM

## 2024-12-20 RX ORDER — HEPARIN SODIUM 1000 [USP'U]/ML
40 INJECTION, SOLUTION INTRAVENOUS; SUBCUTANEOUS ONCE
Status: DISCONTINUED | OUTPATIENT
Start: 2024-12-20 | End: 2024-12-20

## 2024-12-20 RX ORDER — POLYETHYLENE GLYCOL 3350 17 G/17G
17 POWDER, FOR SOLUTION ORAL DAILY PRN
Status: DISCONTINUED | OUTPATIENT
Start: 2024-12-20 | End: 2024-12-22 | Stop reason: HOSPADM

## 2024-12-20 RX ORDER — SUCCINYLCHOLINE/SOD CL,ISO/PF 200MG/10ML
SYRINGE (ML) INTRAVENOUS
Status: DISCONTINUED | OUTPATIENT
Start: 2024-12-20 | End: 2024-12-20 | Stop reason: SDUPTHER

## 2024-12-20 RX ORDER — IOPAMIDOL 612 MG/ML
INJECTION, SOLUTION INTRAVASCULAR PRN
Status: COMPLETED | OUTPATIENT
Start: 2024-12-20 | End: 2024-12-20

## 2024-12-20 RX ORDER — EPTIFIBATIDE 0.75 MG/ML
INJECTION, SOLUTION INTRAVENOUS CONTINUOUS PRN
Status: COMPLETED | OUTPATIENT
Start: 2024-12-20 | End: 2024-12-20

## 2024-12-20 RX ORDER — ATORVASTATIN CALCIUM 40 MG/1
80 TABLET, FILM COATED ORAL NIGHTLY
Status: DISCONTINUED | OUTPATIENT
Start: 2024-12-20 | End: 2024-12-22 | Stop reason: HOSPADM

## 2024-12-20 RX ORDER — ONDANSETRON 2 MG/ML
4 INJECTION INTRAMUSCULAR; INTRAVENOUS EVERY 6 HOURS PRN
Status: DISCONTINUED | OUTPATIENT
Start: 2024-12-20 | End: 2024-12-22 | Stop reason: HOSPADM

## 2024-12-20 RX ORDER — LIDOCAINE HYDROCHLORIDE 10 MG/ML
INJECTION, SOLUTION EPIDURAL; INFILTRATION; INTRACAUDAL; PERINEURAL PRN
Status: COMPLETED | OUTPATIENT
Start: 2024-12-20 | End: 2024-12-20

## 2024-12-20 RX ORDER — DULOXETIN HYDROCHLORIDE 30 MG/1
30 CAPSULE, DELAYED RELEASE ORAL DAILY
Status: DISCONTINUED | OUTPATIENT
Start: 2024-12-20 | End: 2024-12-22 | Stop reason: HOSPADM

## 2024-12-20 RX ORDER — HEPARIN SODIUM 1000 [USP'U]/ML
60 INJECTION, SOLUTION INTRAVENOUS; SUBCUTANEOUS PRN
Status: DISCONTINUED | OUTPATIENT
Start: 2024-12-20 | End: 2024-12-21

## 2024-12-20 RX ORDER — HEPARIN SODIUM 1000 [USP'U]/ML
15 INJECTION, SOLUTION INTRAVENOUS; SUBCUTANEOUS PRN
Status: DISCONTINUED | OUTPATIENT
Start: 2024-12-20 | End: 2024-12-21

## 2024-12-20 RX ORDER — IOPAMIDOL 755 MG/ML
100 INJECTION, SOLUTION INTRAVASCULAR
Status: COMPLETED | OUTPATIENT
Start: 2024-12-20 | End: 2024-12-20

## 2024-12-20 RX ORDER — DIAZEPAM 5 MG/1
5 TABLET ORAL EVERY 8 HOURS PRN
Status: DISCONTINUED | OUTPATIENT
Start: 2024-12-20 | End: 2024-12-22 | Stop reason: HOSPADM

## 2024-12-20 RX ORDER — ASPIRIN 300 MG/1
SUPPOSITORY RECTAL PRN
Status: COMPLETED | OUTPATIENT
Start: 2024-12-20 | End: 2024-12-20

## 2024-12-20 RX ORDER — FAMOTIDINE 20 MG/1
20 TABLET, FILM COATED ORAL ONCE
Status: COMPLETED | OUTPATIENT
Start: 2024-12-20 | End: 2024-12-20

## 2024-12-20 RX ORDER — PHENYLEPHRINE HCL IN 0.9% NACL 0.4MG/10ML
SYRINGE (ML) INTRAVENOUS
Status: DISCONTINUED | OUTPATIENT
Start: 2024-12-20 | End: 2024-12-20 | Stop reason: SDUPTHER

## 2024-12-20 RX ORDER — ASPIRIN 81 MG/1
81 TABLET, CHEWABLE ORAL DAILY
Status: DISCONTINUED | OUTPATIENT
Start: 2024-12-21 | End: 2024-12-22 | Stop reason: HOSPADM

## 2024-12-20 RX ORDER — GLYCOPYRROLATE 0.2 MG/ML
INJECTION INTRAMUSCULAR; INTRAVENOUS
Status: DISCONTINUED | OUTPATIENT
Start: 2024-12-20 | End: 2024-12-20 | Stop reason: SDUPTHER

## 2024-12-20 RX ORDER — HEPARIN SODIUM 10000 [USP'U]/100ML
5-30 INJECTION, SOLUTION INTRAVENOUS CONTINUOUS
Status: DISCONTINUED | OUTPATIENT
Start: 2024-12-20 | End: 2024-12-21

## 2024-12-20 RX ORDER — ACETAMINOPHEN 325 MG/1
650 TABLET ORAL EVERY 6 HOURS PRN
Status: DISCONTINUED | OUTPATIENT
Start: 2024-12-20 | End: 2024-12-22 | Stop reason: HOSPADM

## 2024-12-20 RX ADMIN — GLYCOPYRROLATE 0.4 MG: 0.2 INJECTION INTRAMUSCULAR; INTRAVENOUS at 14:51

## 2024-12-20 RX ADMIN — ACETAMINOPHEN 650 MG: 325 TABLET ORAL at 18:57

## 2024-12-20 RX ADMIN — HEPARIN SODIUM 100 ML: 1000 INJECTION INTRAVENOUS; SUBCUTANEOUS at 16:02

## 2024-12-20 RX ADMIN — GLYCOPYRROLATE 0.4 MG: 0.2 INJECTION INTRAMUSCULAR; INTRAVENOUS at 15:50

## 2024-12-20 RX ADMIN — Medication 80 MCG: at 16:04

## 2024-12-20 RX ADMIN — ATORVASTATIN CALCIUM 80 MG: 40 TABLET, FILM COATED ORAL at 20:23

## 2024-12-20 RX ADMIN — HEPARIN SODIUM 12 UNITS/KG/HR: 10000 INJECTION, SOLUTION INTRAVENOUS at 18:19

## 2024-12-20 RX ADMIN — Medication 80 MCG: at 15:54

## 2024-12-20 RX ADMIN — HEPARIN SODIUM 100 ML: 1000 INJECTION INTRAVENOUS; SUBCUTANEOUS at 16:01

## 2024-12-20 RX ADMIN — HEPARIN SODIUM 100 ML: 1000 INJECTION INTRAVENOUS; SUBCUTANEOUS at 16:00

## 2024-12-20 RX ADMIN — IOPAMIDOL 127 ML: 612 INJECTION, SOLUTION INTRAVENOUS at 16:45

## 2024-12-20 RX ADMIN — PROPOFOL 50 MG: 10 INJECTION, EMULSION INTRAVENOUS at 16:30

## 2024-12-20 RX ADMIN — ASPIRIN 600 MG: 300 SUPPOSITORY RECTAL at 14:34

## 2024-12-20 RX ADMIN — HEPARIN SODIUM 3000 UNITS: 1000 INJECTION, SOLUTION INTRAVENOUS; SUBCUTANEOUS at 16:02

## 2024-12-20 RX ADMIN — Medication 120 MCG: at 14:42

## 2024-12-20 RX ADMIN — VASOPRESSIN 1 UNITS: 20 INJECTION INTRAVENOUS at 15:16

## 2024-12-20 RX ADMIN — ONDANSETRON 4 MG: 2 INJECTION INTRAMUSCULAR; INTRAVENOUS at 16:35

## 2024-12-20 RX ADMIN — ROCURONIUM BROMIDE 10 MG: 10 INJECTION, SOLUTION INTRAVENOUS at 16:27

## 2024-12-20 RX ADMIN — PROPOFOL 200 MG: 10 INJECTION, EMULSION INTRAVENOUS at 14:30

## 2024-12-20 RX ADMIN — LIDOCAINE HYDROCHLORIDE 5 ML: 10 INJECTION, SOLUTION EPIDURAL; INFILTRATION; INTRACAUDAL; PERINEURAL at 14:35

## 2024-12-20 RX ADMIN — SODIUM CHLORIDE, PRESERVATIVE FREE 10 ML: 5 INJECTION INTRAVENOUS at 20:34

## 2024-12-20 RX ADMIN — VALACYCLOVIR HYDROCHLORIDE 500 MG: 500 TABLET, FILM COATED ORAL at 22:00

## 2024-12-20 RX ADMIN — SUGAMMADEX 200 MG: 100 INJECTION, SOLUTION INTRAVENOUS at 17:01

## 2024-12-20 RX ADMIN — HEPARIN SODIUM 4000 UNITS: 1000 INJECTION, SOLUTION INTRAVENOUS; SUBCUTANEOUS at 14:53

## 2024-12-20 RX ADMIN — TICAGRELOR 180 MG: 90 TABLET ORAL at 14:41

## 2024-12-20 RX ADMIN — HEPARIN SODIUM 2000 UNITS: 1000 INJECTION, SOLUTION INTRAVENOUS; SUBCUTANEOUS at 15:29

## 2024-12-20 RX ADMIN — SODIUM CHLORIDE, POTASSIUM CHLORIDE, SODIUM LACTATE AND CALCIUM CHLORIDE: 600; 310; 30; 20 INJECTION, SOLUTION INTRAVENOUS at 14:30

## 2024-12-20 RX ADMIN — PHENYLEPHRINE HYDROCHLORIDE 60 MCG/MIN: 10 INJECTION INTRAVENOUS at 14:32

## 2024-12-20 RX ADMIN — IOPAMIDOL 100 ML: 755 INJECTION, SOLUTION INTRAVENOUS at 12:54

## 2024-12-20 RX ADMIN — HEPARIN SODIUM 100 ML: 1000 INJECTION INTRAVENOUS; SUBCUTANEOUS at 16:04

## 2024-12-20 RX ADMIN — HEPARIN SODIUM 100 ML: 1000 INJECTION INTRAVENOUS; SUBCUTANEOUS at 16:03

## 2024-12-20 RX ADMIN — Medication 200 MG: at 14:30

## 2024-12-20 RX ADMIN — Medication 80 MCG: at 16:38

## 2024-12-20 RX ADMIN — EPTIFIBATIDE 8.25 ML/HR: 0.75 INJECTION, SOLUTION INTRAVENOUS at 16:02

## 2024-12-20 RX ADMIN — SODIUM CHLORIDE: 9 INJECTION, SOLUTION INTRAVENOUS at 18:23

## 2024-12-20 RX ADMIN — HEPARIN SODIUM 3000 UNITS: 1000 INJECTION, SOLUTION INTRAVENOUS; SUBCUTANEOUS at 15:43

## 2024-12-20 RX ADMIN — FAMOTIDINE 20 MG: 20 TABLET, FILM COATED ORAL at 22:00

## 2024-12-20 RX ADMIN — LABETALOL HYDROCHLORIDE 20 MG: 5 INJECTION INTRAVENOUS at 13:50

## 2024-12-20 RX ADMIN — VASOPRESSIN 2 UNITS: 20 INJECTION INTRAVENOUS at 14:45

## 2024-12-20 RX ADMIN — PHENYLEPHRINE HYDROCHLORIDE 30 MCG/MIN: 10 INJECTION INTRAVENOUS at 19:07

## 2024-12-20 RX ADMIN — LIDOCAINE HYDROCHLORIDE 60 MG: 20 INJECTION, SOLUTION EPIDURAL; INFILTRATION; INTRACAUDAL; PERINEURAL at 14:30

## 2024-12-20 RX ADMIN — Medication 120 MCG: at 14:38

## 2024-12-20 RX ADMIN — ROCURONIUM BROMIDE 50 MG: 10 INJECTION, SOLUTION INTRAVENOUS at 14:35

## 2024-12-20 RX ADMIN — DULOXETINE HYDROCHLORIDE 30 MG: 30 CAPSULE, DELAYED RELEASE ORAL at 20:23

## 2024-12-20 RX ADMIN — PROPOFOL 50 MG: 10 INJECTION, EMULSION INTRAVENOUS at 15:45

## 2024-12-20 RX ADMIN — HEPARIN SODIUM 2000 UNITS: 1000 INJECTION, SOLUTION INTRAVENOUS; SUBCUTANEOUS at 15:11

## 2024-12-20 ASSESSMENT — PAIN SCALES - GENERAL
PAINLEVEL_OUTOF10: 2
PAINLEVEL_OUTOF10: 3
PAINLEVEL_OUTOF10: 4

## 2024-12-20 ASSESSMENT — LIFESTYLE VARIABLES
HOW OFTEN DO YOU HAVE A DRINK CONTAINING ALCOHOL: NEVER
HOW MANY STANDARD DRINKS CONTAINING ALCOHOL DO YOU HAVE ON A TYPICAL DAY: PATIENT DOES NOT DRINK

## 2024-12-20 ASSESSMENT — PAIN DESCRIPTION - LOCATION
LOCATION: HEAD
LOCATION: HEAD

## 2024-12-20 ASSESSMENT — PAIN DESCRIPTION - DESCRIPTORS: DESCRIPTORS: ACHING

## 2024-12-20 ASSESSMENT — PAIN - FUNCTIONAL ASSESSMENT: PAIN_FUNCTIONAL_ASSESSMENT: 0-10

## 2024-12-20 NOTE — ADDENDUM NOTE
Addendum  created 12/20/24 1744 by Anna Domingo APRN - CRNA    Intraprocedure Event edited, SmartForm saved

## 2024-12-20 NOTE — BRIEF OP NOTE
Brief Procedure Note      Patient: Zohreh Bajwa MRN: [id number]     YOB: 1985  Age: 39 y.o.  Sex: female      Service: Neurointerventional Surgery    Date of Procedure: 12/20/2024    Pre-Procedure Diagnosis: Stroke; carotid occlusion    Post-Procedure Diagnosis: SAME    : Praneeth Lane MD    Assistant(s): N/A    Procedure(s):   Diagnostic cerebral angiogram  Angioplasty and stenting of right carotid artery  Transarterial mechanical thrombectomy, CNS: Right ICA, Right MCA  Post-stent angioplasty  Intra-arterial administration of a lytic agent: Integrilin administration into right ICA  Control angiography  Placement of arteriotomy closure device    Vessels Studied:   Right CCA  Right ICA  Left CCA    Puncture Site: Right femoral artery    Preliminary Findings:   Proximal right ICA occlusion with distal reconstitution vai ECA-ICA collaterals and Right M2 occlusion. 600 mg aspirin administered [place] and 180 mg Brilinta administered per OGT at start of procedure. S/p angioplasty and stenting of right ICA and Aspiration thrombectomy in right MCA. There was thrombus accumulation in the stent. Repeat angiography after a short wait revealed significant thrombus limiting flow. Cone beam CT performed to evaluate for ICH prior to administering additional heparin and Integrilin-- there was no evidence of ICH. In-stent angioplasty was performed, additional heparin administered IV, half-bolus dose (6.1875 mg) of Integrilin was administered into the right ICA, and in-stent aspiration thrombectomy was performed. Repeat angiography after a short wait showed no interval thrombus re-accumulation. Patient's BP spiked. Repeat Cone beam CT showed no ICH. Total of 14,000 U Heparin administered IV throughout procedure.    Plan:   Admit to ICU  SBP   Continue DAPT  Heparin gtt overnight  Q 1 hr neurochecks  Consult Neurology      Specimens: None    Implants: XACT stent 7-9 mm (right carotid); StarClose  closure device (right CFA)    Drains: None    Anesthesia: GA    Estimated Blood Loss: 50 cc      Apparent Intraoperative Complications: None immediate    Patient Condition: Stable    Disposition: ICU      Signed:   Praneeth Lane MD    [facility] Neurointerventional Surgery  [facility]

## 2024-12-20 NOTE — PROGRESS NOTES
TRANSFER - OUT REPORT:    Verbal report given to Debi STINSON (name) on Zohreh Bajwa being transferred to ICU (unit) for routine progression of patient care       Report consisted of patient's Situation, Background, Assessment and   Recommendations(SBAR).     Information from the following report(s) Nurse Handoff Report, Adult Overview, Surgery Report, MAR, Cardiac Rhythm NSR/sinus tach, and Neuro Assessment was reviewed with the receiving nurse.    Opportunity for questions and clarification was provided.      Patient transported with:   Monitor  O2 @ 2 liters  Registered Nurse

## 2024-12-20 NOTE — ANESTHESIA PROCEDURE NOTES
Arterial Line:    An arterial line was placed using surface landmarks, in the procedure area for the following indication(s): continuous blood pressure monitoring and blood sampling needed.    A 20 gauge (size) (length), Arrow (type) catheter was placed, Seldinger technique used, into the left radial artery, secured by Tegaderm.  Anesthesia type: Nkovuhr9012/20/2024 2:40 PM12/20/2024 2:52 PM  Performed: Anesthesiologist   Preanesthetic Checklist  Completed: patient identified, IV checked, site marked, risks and benefits discussed, surgical/procedural consents, equipment checked, pre-op evaluation, timeout performed, anesthesia consent given, oxygen available, monitors applied/VS acknowledged, fire risk safety assessment completed and verbalized and blood product R/B/A discussed and consented

## 2024-12-20 NOTE — ED NOTES
Patient administered labetalol as per MAR. Patient was already on [facility] stretcher and we were walking towards the door. [facility] RN was advised of labetalol dose and witnessed administration. CC RN to re-evaluate BP and update receiving facility. IR RN notified that CC RN to re-evaluate BP en route and update receiving facility.     Reason for transport explained to patient. Patient verbalized understanding. EMTALA signatures from patient and CCRN not obtained due to emergent transfer need. [facility] left facility at 1355.    SBAR Report given to IR RN at 1352 at [facility] for ICAO, right.  TRANSFER - OUT REPORT:    Verbal report given to IR RN on Zohreh Bajwa  being transferred to [facility] Angio IR for routine progression of patient care       Report consisted of patient's Situation, Background, Assessment and   Recommendations(SBAR).     Information from the following report(s) Nurse Handoff Report, Index, ED Encounter Summary, ED SBAR, Adult Overview, MAR, Cardiac Rhythm NSR, Neuro Assessment, and Event Log was reviewed with the receiving nurse.    [place] Assessment:    Presents to emergency department  because of falls (Syncope, seizure, or loss of consciousness): No  Age > 70: No  Altered Mental Status, Intoxication with alcohol or substance confusion (Disorientation, impaired judgment, poor safety awaremess, or inability to follow instructions): No  Impaired Mobility: Ambulates or transfers with assistive devices or assistance; Unable to ambulate or transer.: Yes  Nursing Judgement: Yes          Lines:   Peripheral IV 12/20/24 Left Antecubital (Active)       Peripheral IV 12/20/24 Right Antecubital (Active)        Opportunity for questions and clarification was provided.      Patient transported with:  Monitor    EMS report given at same time as IR RN report.

## 2024-12-20 NOTE — ANESTHESIA POSTPROCEDURE EVALUATION
Department of Anesthesiology  Postprocedure Note    Patient: Zohreh Bajwa  MRN: [id number]  YOB: 1985  Date of evaluation: 12/20/2024    Procedure Summary       Date: 12/20/24 Room / Location: [facility] ANGIO IR    Anesthesia Start: 1426 Anesthesia Stop: 1731    Procedure: IR MECHANICAL ART THROMBECTOMY INTRACRANIAL Diagnosis: (stroke)    Scheduled Providers: Praneeth Lane MD Responsible Provider: Elan Anderson MD    Anesthesia Type: general ASA Status: 3            Anesthesia Type: No value filed.    Bala Phase I:      Bala Phase II:      Anesthesia Post Evaluation    Patient location during evaluation: PACU  Patient participation: complete - patient participated  Level of consciousness: awake  Airway patency: patent  Nausea & Vomiting: no nausea  Cardiovascular status: blood pressure returned to baseline and hemodynamically stable  Respiratory status: acceptable  Hydration status: stable  Multimodal analgesia pain management approach    No notable events documented.

## 2024-12-20 NOTE — ANESTHESIA PRE PROCEDURE
• MS (multiple sclerosis) (HCC)    • Psychiatric disorder     ANXIETY   • PUD (peptic ulcer disease)     DUODENAL   • Sinus problem        Past Surgical History:        Procedure Laterality Date   • APPENDECTOMY  1994   • BREAST LUMPECTOMY Right    • DILATION AND CURETTAGE OF UTERUS  2010    ectopic pregnancy with laproscopy   • GYN  2011    c- section   • GYN  10/2012    TVH   • HEENT      SEPTOPLASTY   • HYSTERECTOMY N/A    • IR REPLACE PICC W PORT Right     chest   • ORTHOPEDIC SURGERY      right metatarsal oseotomy   • OTHER SURGICAL HISTORY      eye surgery   • OTHER SURGICAL HISTORY      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   • SEPTOPLASTY      Repair nasal septum defect   • TONSILLECTOMY  2001       Social History:    Social History     Tobacco Use   • Smoking status: Every Day     Current packs/day: 0.50     Types: Cigarettes   • Smokeless tobacco: Never   Substance Use Topics   • Alcohol use: No                                Ready to quit: Not Answered  Counseling given: Not Answered      Vital Signs (Current): There were no vitals filed for this visit.                                           BP Readings from Last 3 Encounters:   12/20/24 (!) 197/107       NPO Status:                                                                                 BMI:   Wt Readings from Last 3 Encounters:   12/20/24 69.9 kg (154 lb 1.6 oz)   09/29/21 72.6 kg (160 lb)     There is no height or weight on file to calculate BMI.    CBC:   Lab Results   Component Value Date/Time    WBC 15.6 12/20/2024 12:52 PM    RBC 4.15 12/20/2024 12:52 PM    HGB 13.6 12/20/2024 12:52 PM    HCT 38.7 12/20/2024 12:52 PM    MCV 93.3 12/20/2024 12:52 PM    RDW 12.5 12/20/2024 12:52 PM     12/20/2024 12:52 PM       CMP:   Lab Results   Component Value Date/Time     12/20/2024 12:52 PM    K 3.8 12/20/2024 12:52 PM     12/20/2024 12:52 PM    CO2 25 12/20/2024 12:52 PM    BUN 10 12/20/2024 12:52 PM    CREATININE 0.56

## 2024-12-20 NOTE — PROGRESS NOTES
Date/Time Last Known Well Time: 1205    Date/Time Discovery of Stroke Symptoms: 1205    NIHSS upon arrival: 6    Time to IR Suite: 1424    Time of Arterial Puncture: 1435    1st pass of recanalization device in   Target vessel: 1443    Time of Revascularization: 1531    Pretreatment TICI: 0    Post treatment TICI: 3    Procedure Stop Time(When sterile drape is off): 1659    Time of first set of VS, neuro cks, puncture site, and pulse checks: 1700    Time transfer to ICU: 1717    Time of last VS, neuro, puncture site, and pulse checks documentation before ICU caring for pt: 1715    Time of first ICU assessment: 1730    EVD status: N/A            TRANSFER - OUT REPORT:    Verbal report given to Debi RN on Zohreh Bajwa  being transferred to ICU for routine progression of patient care       Report consisted of patient's Situation, Background, Assessment and   Recommendations(SBAR).     Information from the following report(s) Nurse Handoff Report, Surgery Report, MAR, and Neuro Assessment was reviewed with the receiving nurse.           Lines:   Peripheral IV 12/20/24 Left Antecubital (Active)       Peripheral IV 12/20/24 Right Antecubital (Active)       Arterial Line 12/20/24 Radial (Active)        Opportunity for questions and clarification was provided.      Patient transported with:  Monitor, O2 @ 2lpm, and Registered Nurse      Dual neuro completed with IR RN and receiving ICU RN upon arrival to unit.

## 2024-12-20 NOTE — ED PROVIDER NOTES
[facility] [place]  EMERGENCY DEPARTMENT ENCOUNTER      Pt Name: Zohreh Bajwa  MRN: [id number]  Birthdate 1985  Date of evaluation: 12/20/2024  Provider: Joie Ghotra DO    CHIEF COMPLAINT       Chief Complaint   Patient presents with    Aphasia         HISTORY OF PRESENT ILLNESS    HPI    Zohreh Bajwa is a 39 y.o. female with a history of hyperlipidemia, GERD, CNS demyelinating disease being worked up for MS who presents to the emergency department speech difficulties and face numbness.  Patient reports around 12:05 PM while at work she had slurred speech and difficulty speaking.  She also noted right sided facial numbness.  States speech has since improved and returned back to normal but continues with right sided facial numbness.  Denies any associated headache.  Of note, she has chronic left upper and lower extremity weakness due to demyelinating disease which she is being seen by neurology and evaluated for suspected MS.  States that her left-sided weakness is unchanged from baseline.  Notes that she has right lower extremity weakness which she noticed this morning when she woke up but thought it was due to recent procedure where she had hormone pellet therapy placed in her buttock as she is post hysterectomy.    Nursing Notes were reviewed.    REVIEW OF SYSTEMS       Review of Systems   Constitutional:  Negative for fever.   Cardiovascular:  Negative for chest pain.   Neurological:  Positive for speech difficulty and numbness. Negative for headaches.           PAST MEDICAL HISTORY     Past Medical History:   Diagnosis Date    Arrhythmia     TACHYCARDIA    Autoimmune disease (HCC)     Chronic pain     Crohn's disease (HCC)     Dizziness     Dysautonomia (HCC)     GERD (gastroesophageal reflux disease)     Headache     Hyperlipidemia     Hypertriglyceridemia     Irregular heartbeat     Meningitis     MS (multiple sclerosis) (HCC)     Psychiatric disorder     ANXIETY    PUD (peptic

## 2024-12-20 NOTE — ED TRIAGE NOTES
Pt reports to ED w/ cc of slurred speech and right sided facial numbness. Pt states this started at 1205. Pt said the slurred speech lasted 10 minutes.     Denies headache, chest pain.     [place] DO at bedside for assessment. Level 1 stroke called at this time.

## 2024-12-20 NOTE — ED NOTES
Level 1 stroke alert, [facility] Emergency Line called, as well as Tele-Neuro

## 2024-12-21 ENCOUNTER — APPOINTMENT (OUTPATIENT)
Facility: HOSPITAL | Age: 39
DRG: 024 | End: 2024-12-21
Payer: COMMERCIAL

## 2024-12-21 PROBLEM — I65.21 RIGHT CAROTID ARTERY OCCLUSION: Status: ACTIVE | Noted: 2024-12-21

## 2024-12-21 LAB
AMPHET UR QL SCN: NEGATIVE
ANION GAP SERPL CALC-SCNC: 5 MMOL/L (ref 2–12)
APTT PPP: 58.7 SEC (ref 22.1–31)
BARBITURATES UR QL SCN: NEGATIVE
BENZODIAZ UR QL: POSITIVE
BUN SERPL-MCNC: 6 MG/DL (ref 6–20)
BUN/CREAT SERPL: 14 (ref 12–20)
CALCIUM SERPL-MCNC: 7.5 MG/DL (ref 8.5–10.1)
CANNABINOIDS UR QL SCN: NEGATIVE
CHLORIDE SERPL-SCNC: 114 MMOL/L (ref 97–108)
CHOLEST SERPL-MCNC: 138 MG/DL
CO2 SERPL-SCNC: 21 MMOL/L (ref 21–32)
COCAINE UR QL SCN: NEGATIVE
CREAT SERPL-MCNC: 0.43 MG/DL (ref 0.55–1.02)
ECHO AO ROOT DIAM: 2.9 CM
ECHO AO ROOT INDEX: 1.68 CM/M2
ECHO AV PEAK GRADIENT: 9 MMHG
ECHO AV PEAK VELOCITY: 1.5 M/S
ECHO AV VELOCITY RATIO: 0.73
ECHO BSA: 1.73 M2
ECHO EST RA PRESSURE: 3 MMHG
ECHO LA DIAMETER INDEX: 2.2 CM/M2
ECHO LA DIAMETER: 3.8 CM
ECHO LA TO AORTIC ROOT RATIO: 1.31
ECHO LA VOL A-L A2C: 45 ML (ref 22–52)
ECHO LA VOL A-L A4C: 61 ML (ref 22–52)
ECHO LA VOL MOD A2C: 41 ML (ref 22–52)
ECHO LA VOL MOD A4C: 56 ML (ref 22–52)
ECHO LA VOLUME AREA LENGTH: 58 ML
ECHO LA VOLUME INDEX A-L A2C: 26 ML/M2 (ref 16–34)
ECHO LA VOLUME INDEX A-L A4C: 35 ML/M2 (ref 16–34)
ECHO LA VOLUME INDEX AREA LENGTH: 34 ML/M2 (ref 16–34)
ECHO LA VOLUME INDEX MOD A2C: 24 ML/M2 (ref 16–34)
ECHO LA VOLUME INDEX MOD A4C: 32 ML/M2 (ref 16–34)
ECHO LV E' LATERAL VELOCITY: 10.65 CM/S
ECHO LV E' SEPTAL VELOCITY: 10.22 CM/S
ECHO LV EDV A2C: 29 ML
ECHO LV EDV A4C: 71 ML
ECHO LV EDV BP: 46 ML (ref 56–104)
ECHO LV EDV INDEX A4C: 41 ML/M2
ECHO LV EDV INDEX BP: 27 ML/M2
ECHO LV EDV NDEX A2C: 17 ML/M2
ECHO LV EJECTION FRACTION A2C: 68 %
ECHO LV EJECTION FRACTION A4C: 79 %
ECHO LV EJECTION FRACTION BIPLANE: 74 % (ref 55–100)
ECHO LV ESV A2C: 9 ML
ECHO LV ESV A4C: 15 ML
ECHO LV ESV BP: 12 ML (ref 19–49)
ECHO LV ESV INDEX A2C: 5 ML/M2
ECHO LV ESV INDEX A4C: 9 ML/M2
ECHO LV ESV INDEX BP: 7 ML/M2
ECHO LV FRACTIONAL SHORTENING: 37 % (ref 28–44)
ECHO LV INTERNAL DIMENSION DIASTOLE INDEX: 3.01 CM/M2
ECHO LV INTERNAL DIMENSION DIASTOLIC: 5.2 CM (ref 3.9–5.3)
ECHO LV INTERNAL DIMENSION SYSTOLIC INDEX: 1.91 CM/M2
ECHO LV INTERNAL DIMENSION SYSTOLIC: 3.3 CM
ECHO LV IVSD: 0.8 CM (ref 0.6–0.9)
ECHO LV MASS 2D: 145.2 G (ref 67–162)
ECHO LV MASS INDEX 2D: 83.9 G/M2 (ref 43–95)
ECHO LV POSTERIOR WALL DIASTOLIC: 0.8 CM (ref 0.6–0.9)
ECHO LV RELATIVE WALL THICKNESS RATIO: 0.31
ECHO LVOT PEAK GRADIENT: 4 MMHG
ECHO LVOT PEAK VELOCITY: 1.1 M/S
ECHO MV A VELOCITY: 0.69 M/S
ECHO MV AREA PHT: 3.7 CM2
ECHO MV E DECELERATION TIME (DT): 203.3 MS
ECHO MV E VELOCITY: 0.97 M/S
ECHO MV E/A RATIO: 1.41
ECHO MV E/E' LATERAL: 9.11
ECHO MV E/E' RATIO (AVERAGED): 9.3
ECHO MV E/E' SEPTAL: 9.49
ECHO MV PRESSURE HALF TIME (PHT): 59 MS
ECHO RIGHT VENTRICULAR SYSTOLIC PRESSURE (RVSP): 15 MMHG
ECHO RV FREE WALL PEAK S': 14.3 CM/S
ECHO RV TAPSE: 2.1 CM (ref 1.7–?)
ECHO TV REGURGITANT MAX VELOCITY: 1.72 M/S
ECHO TV REGURGITANT PEAK GRADIENT: 12 MMHG
ERYTHROCYTE [DISTWIDTH] IN BLOOD BY AUTOMATED COUNT: 12.5 % (ref 11.5–14.5)
ERYTHROCYTE [DISTWIDTH] IN BLOOD BY AUTOMATED COUNT: 12.8 % (ref 11.5–14.5)
EST. AVERAGE GLUCOSE BLD GHB EST-MCNC: 114 MG/DL
GLUCOSE SERPL-MCNC: 94 MG/DL (ref 65–100)
HBA1C MFR BLD: 5.6 % (ref 4–5.6)
HCT VFR BLD AUTO: 25.7 % (ref 35–47)
HCT VFR BLD AUTO: 26.4 % (ref 35–47)
HDLC SERPL-MCNC: 29 MG/DL
HDLC SERPL: 4.8 (ref 0–5)
HGB BLD-MCNC: 8.7 G/DL (ref 11.5–16)
HGB BLD-MCNC: 9.2 G/DL (ref 11.5–16)
INR PPP: 1.1 (ref 0.9–1.1)
LDLC SERPL CALC-MCNC: 65.2 MG/DL (ref 0–100)
Lab: ABNORMAL
MCH RBC QN AUTO: 32.5 PG (ref 26–34)
MCH RBC QN AUTO: 33.1 PG (ref 26–34)
MCHC RBC AUTO-ENTMCNC: 33.9 G/DL (ref 30–36.5)
MCHC RBC AUTO-ENTMCNC: 34.8 G/DL (ref 30–36.5)
MCV RBC AUTO: 95 FL (ref 80–99)
MCV RBC AUTO: 95.9 FL (ref 80–99)
METHADONE UR QL: NEGATIVE
NRBC # BLD: 0 K/UL (ref 0–0.01)
NRBC # BLD: 0 K/UL (ref 0–0.01)
NRBC BLD-RTO: 0 PER 100 WBC
NRBC BLD-RTO: 0 PER 100 WBC
OPIATES UR QL: NEGATIVE
PCP UR QL: NEGATIVE
PLATELET # BLD AUTO: 150 K/UL (ref 150–400)
PLATELET # BLD AUTO: 159 K/UL (ref 150–400)
PMV BLD AUTO: 12.7 FL (ref 8.9–12.9)
PMV BLD AUTO: 12.8 FL (ref 8.9–12.9)
POTASSIUM SERPL-SCNC: 3.2 MMOL/L (ref 3.5–5.1)
PROTHROMBIN TIME: 11.6 SEC (ref 9–11.1)
RBC # BLD AUTO: 2.68 M/UL (ref 3.8–5.2)
RBC # BLD AUTO: 2.78 M/UL (ref 3.8–5.2)
SODIUM SERPL-SCNC: 140 MMOL/L (ref 136–145)
THERAPEUTIC RANGE: ABNORMAL SECS (ref 58–77)
TRIGL SERPL-MCNC: 219 MG/DL
UFH PPP CHRO-ACNC: 0.41 IU/ML
UFH PPP CHRO-ACNC: 0.65 IU/ML
VLDLC SERPL CALC-MCNC: 43.8 MG/DL
WBC # BLD AUTO: 13.1 K/UL (ref 3.6–11)
WBC # BLD AUTO: 17.7 K/UL (ref 3.6–11)

## 2024-12-21 PROCEDURE — 85597 PHOSPHOLIPID PLTLT NEUTRALIZ: CPT

## 2024-12-21 PROCEDURE — 86037 ANCA TITER EACH ANTIBODY: CPT

## 2024-12-21 PROCEDURE — 80307 DRUG TEST PRSMV CHEM ANLYZR: CPT

## 2024-12-21 PROCEDURE — 83516 IMMUNOASSAY NONANTIBODY: CPT

## 2024-12-21 PROCEDURE — 6370000000 HC RX 637 (ALT 250 FOR IP)

## 2024-12-21 PROCEDURE — 86146 BETA-2 GLYCOPROTEIN ANTIBODY: CPT

## 2024-12-21 PROCEDURE — 70551 MRI BRAIN STEM W/O DYE: CPT

## 2024-12-21 PROCEDURE — 85303 CLOT INHIBIT PROT C ACTIVITY: CPT

## 2024-12-21 PROCEDURE — 6370000000 HC RX 637 (ALT 250 FOR IP): Performed by: NURSE PRACTITIONER

## 2024-12-21 PROCEDURE — 80061 LIPID PANEL: CPT

## 2024-12-21 PROCEDURE — 85730 THROMBOPLASTIN TIME PARTIAL: CPT

## 2024-12-21 PROCEDURE — 97535 SELF CARE MNGMENT TRAINING: CPT

## 2024-12-21 PROCEDURE — 85520 HEPARIN ASSAY: CPT

## 2024-12-21 PROCEDURE — 97116 GAIT TRAINING THERAPY: CPT

## 2024-12-21 PROCEDURE — 80048 BASIC METABOLIC PNL TOTAL CA: CPT

## 2024-12-21 PROCEDURE — 99233 SBSQ HOSP IP/OBS HIGH 50: CPT | Performed by: NURSE PRACTITIONER

## 2024-12-21 PROCEDURE — 2500000003 HC RX 250 WO HCPCS: Performed by: NURSE PRACTITIONER

## 2024-12-21 PROCEDURE — 85300 ANTITHROMBIN III ACTIVITY: CPT

## 2024-12-21 PROCEDURE — 86038 ANTINUCLEAR ANTIBODIES: CPT

## 2024-12-21 PROCEDURE — 85613 RUSSELL VIPER VENOM DILUTED: CPT

## 2024-12-21 PROCEDURE — 85306 CLOT INHIBIT PROT S FREE: CPT

## 2024-12-21 PROCEDURE — 70450 CT HEAD/BRAIN W/O DYE: CPT

## 2024-12-21 PROCEDURE — 85305 CLOT INHIBIT PROT S TOTAL: CPT

## 2024-12-21 PROCEDURE — 97165 OT EVAL LOW COMPLEX 30 MIN: CPT

## 2024-12-21 PROCEDURE — 83036 HEMOGLOBIN GLYCOSYLATED A1C: CPT

## 2024-12-21 PROCEDURE — 97161 PT EVAL LOW COMPLEX 20 MIN: CPT

## 2024-12-21 PROCEDURE — 6370000000 HC RX 637 (ALT 250 FOR IP): Performed by: INTERNAL MEDICINE

## 2024-12-21 PROCEDURE — 81240 F2 GENE: CPT

## 2024-12-21 PROCEDURE — APPNB45 APP NON BILLABLE 31-45 MINUTES

## 2024-12-21 PROCEDURE — 93306 TTE W/DOPPLER COMPLETE: CPT

## 2024-12-21 PROCEDURE — 99223 1ST HOSP IP/OBS HIGH 75: CPT | Performed by: STUDENT IN AN ORGANIZED HEALTH CARE EDUCATION/TRAINING PROGRAM

## 2024-12-21 PROCEDURE — 6360000002 HC RX W HCPCS: Performed by: INTERNAL MEDICINE

## 2024-12-21 PROCEDURE — 86147 CARDIOLIPIN ANTIBODY EA IG: CPT

## 2024-12-21 PROCEDURE — 2580000003 HC RX 258: Performed by: NURSE PRACTITIONER

## 2024-12-21 PROCEDURE — 2000000000 HC ICU R&B

## 2024-12-21 PROCEDURE — 81241 F5 GENE: CPT

## 2024-12-21 PROCEDURE — 93880 EXTRACRANIAL BILAT STUDY: CPT

## 2024-12-21 PROCEDURE — 86148 ANTI-PHOSPHOLIPID ANTIBODY: CPT

## 2024-12-21 PROCEDURE — 6370000000 HC RX 637 (ALT 250 FOR IP): Performed by: PHYSICIAN ASSISTANT

## 2024-12-21 RX ORDER — BUTALBITAL, ACETAMINOPHEN AND CAFFEINE 50; 325; 40 MG/1; MG/1; MG/1
1 TABLET ORAL EVERY 4 HOURS PRN
Status: DISCONTINUED | OUTPATIENT
Start: 2024-12-21 | End: 2024-12-22

## 2024-12-21 RX ORDER — DIAZEPAM 5 MG/1
5 TABLET ORAL ONCE
Status: COMPLETED | OUTPATIENT
Start: 2024-12-21 | End: 2024-12-21

## 2024-12-21 RX ADMIN — TICAGRELOR 90 MG: 90 TABLET ORAL at 08:57

## 2024-12-21 RX ADMIN — SODIUM CHLORIDE, PRESERVATIVE FREE 10 ML: 5 INJECTION INTRAVENOUS at 08:58

## 2024-12-21 RX ADMIN — DULOXETINE HYDROCHLORIDE 30 MG: 30 CAPSULE, DELAYED RELEASE ORAL at 20:43

## 2024-12-21 RX ADMIN — VALACYCLOVIR HYDROCHLORIDE 500 MG: 500 TABLET, FILM COATED ORAL at 20:32

## 2024-12-21 RX ADMIN — SODIUM CHLORIDE: 9 INJECTION, SOLUTION INTRAVENOUS at 05:43

## 2024-12-21 RX ADMIN — SODIUM CHLORIDE, PRESERVATIVE FREE 10 ML: 5 INJECTION INTRAVENOUS at 20:29

## 2024-12-21 RX ADMIN — DIAZEPAM 5 MG: 5 TABLET ORAL at 01:52

## 2024-12-21 RX ADMIN — ATORVASTATIN CALCIUM 80 MG: 40 TABLET, FILM COATED ORAL at 20:28

## 2024-12-21 RX ADMIN — ACETAMINOPHEN 650 MG: 325 TABLET ORAL at 09:06

## 2024-12-21 RX ADMIN — TICAGRELOR 90 MG: 90 TABLET ORAL at 03:24

## 2024-12-21 RX ADMIN — DIAZEPAM 5 MG: 5 TABLET ORAL at 21:14

## 2024-12-21 RX ADMIN — TICAGRELOR 90 MG: 90 TABLET ORAL at 20:29

## 2024-12-21 RX ADMIN — BUTALBITAL, ACETAMINOPHEN, AND CAFFEINE 1 TABLET: 50; 325; 40 TABLET ORAL at 21:14

## 2024-12-21 RX ADMIN — ACETAMINOPHEN 650 MG: 325 TABLET ORAL at 18:49

## 2024-12-21 RX ADMIN — ASPIRIN 81 MG CHEWABLE TABLET 81 MG: 81 TABLET CHEWABLE at 08:57

## 2024-12-21 RX ADMIN — HEPARIN SODIUM 12 UNITS/KG/HR: 10000 INJECTION, SOLUTION INTRAVENOUS at 18:49

## 2024-12-21 ASSESSMENT — PAIN DESCRIPTION - DESCRIPTORS
DESCRIPTORS: ACHING

## 2024-12-21 ASSESSMENT — PAIN SCALES - GENERAL
PAINLEVEL_OUTOF10: 6
PAINLEVEL_OUTOF10: 6
PAINLEVEL_OUTOF10: 0
PAINLEVEL_OUTOF10: 3
PAINLEVEL_OUTOF10: 4
PAINLEVEL_OUTOF10: 6
PAINLEVEL_OUTOF10: 6

## 2024-12-21 ASSESSMENT — PAIN DESCRIPTION - LOCATION
LOCATION: HEAD

## 2024-12-21 NOTE — CARE COORDINATION
Care Management Initial Assessment       RUR:14%  Readmission? No  1st IM letter given? No-n/a  1st  letter given: No      12/21/24 6327   Service Assessment   Patient Orientation Alert and Oriented   Cognition Alert   History Provided By Patient;Spouse   Primary Caregiver Self   Support Systems Spouse/Significant Other   PCP Verified by CM Yes   Last Visit to PCP Within last 3 months   Prior Functional Level Independent in ADLs/IADLs   Can patient return to prior living arrangement Yes   Ability to make needs known: Good   Family able to assist with home care needs: Yes   Would you like for me to discuss the discharge plan with any other family members/significant others, and if so, who? No     CM met with this pt and her  to introduce them to the role of CM and transition of care. This pt lives at home with her . She has been independent with all of her ADL's and IADL's. Therapy is recommending outpt PT for her when she is discharged. The attending will need to provide her with a prescription for outpatient PT. Pian Hill,LIYA,ACM-SW

## 2024-12-21 NOTE — CONSULTS
[facility]    ICU Team Consult Note/History and Physical    Name: Zohreh Bajwa   : 1985   MRN: [id number]   Date: 2024      Reason for ICU Admission: Post NIS     HPI     38 y/o female w/ Pmhx of GERD, Crohns, ?chronic left-sided weakness to left upper extremity and left lower extremity.of undergoing workup for demyelinating disease including multiple sclerosis as an outpatient with neurology, Presented to the ED with slurred speech and right-sided facial numbness.      Code stroke was called, workup including CT head was negative.  CT angiogram revealed right ICA occlusion versus stenosis with right M2 occlusion.  CT perfusion revealed a large right hemispheric penumbra.  TNK was not given.  Patient was transferred from [place] ED to [facility] for cerebral angiogram and mechanical thrombectomy with angioplasty and stenting of the right ICA.  During procedure, concern for thrombus accumulation in the stent which required in-stent angioplasty plus heparin and Integrilin.  Repeat angiography showed no interval thrombus reaccumulation.    Patient was transferred to the ICU, with persistent symptoms of headache, some blurred vision in the right visual field, and dysarthria.    Active Problem List:     [unfilled]    Past Medical History:      has a past medical history of Arrhythmia, Autoimmune disease (HCC), Chronic pain, Crohn's disease (HCC), Dizziness, Dysautonomia (HCC), GERD (gastroesophageal reflux disease), Headache, Hyperlipidemia, Hypertriglyceridemia, Irregular heartbeat, Meningitis, MS (multiple sclerosis) (HCC), Psychiatric disorder, PUD (peptic ulcer disease), and Sinus problem.    Past Surgical History:      has a past surgical history that includes other surgical history; Hysterectomy (N/A); orthopedic surgery; Appendectomy (); heent; Tonsillectomy (); Dilation and curettage of uterus (); gyn (); gyn (10/2012); IR REPLACE PICC W PORT (Right); 
  Neurointerventional Surgery Consult      Patient: Zohreh Bajwa MRN: [id number]  SSN: [id number]    YOB: 1985  Age: 39 y.o.  Sex: female      HPI      Zohreh Bajwa is a 39 y.o. female w/pmh of GERD, Crohns disease, HLD, Meningitis, PUD,demyelinating disease being worked up for multiple sclerosis by outpatient Neurology who initially presented to [place] ER with acute onset slurred speech and right sided facial numbness. Patient also reported chronic left sided weakness to LUE and LLE. LKW 1205 PM.     Stroke code was activated. CT Head was negative for acute pathology. CT Angio Head and Neck showed R ICA occlusion vs stenosis with R M2 occlusion. CT Perfusion with large right hemispheric penumbra. At OSH NIHSS 5. Given her slurred speech was improving + chronic left sided weakness, TNK was not given.     It was decided to transfer patient to [facility] for EVT. Upon arrival to [facility] she was urgently taken to the angio suite. She underwent diagnostic cerebral angiogram w/mechanical thrombectomy + angioplasty and stenting of the R ICA. Procedure c/b thrombus accumulation in the stent which required in stent angioplasty + heparin and Integrilin. Repeat angiography showed no interval thrombus re accumulation. Cone beam CT obtained showed no ICH during the procedure.     Post procedure she notes R sided headache & tiny fuzzy spots to her right visual field. NIHSS has improved from a 6 to a 1 for dysarthria.     Past Medical History:   Diagnosis Date    Arrhythmia     TACHYCARDIA    Autoimmune disease (HCC)     Chronic pain     Crohn's disease (HCC)     Dizziness     Dysautonomia (HCC)     GERD (gastroesophageal reflux disease)     Headache     Hyperlipidemia     Hypertriglyceridemia     Irregular heartbeat     Meningitis     MS (multiple sclerosis) (HCC)     Psychiatric disorder     ANXIETY    PUD (peptic ulcer disease)     DUODENAL    Sinus problem      Family History   Problem Relation Age of Onset    
R eye only. Etiology of stroke unclear, possibly atherosclerotic with artery-to-artery emboli vs cardioembolic vs hypercoagulable state.    - DAPT and heparin per NIS  - Start atorvastatin 80 mg daily  - Neurochecks per NIS  - SBP goal per NIS  - TTE pending  - A1c 5.6, LDL 65  - Hypercoag workup pending  - Needs smoking cessation counseling  - PT/OT as needed  - Discussed 6 month driving restrictions with patient; patient verbalized understanding  - Follow up in neurology clinic in 6-8 weeks     Mikey Barnett MD  Neurology    Neurology Consult  ORTIZ Nina - NP    Patient: Zohreh Bajwa MRN: [id number]  SSN: [id number]    YOB: 1985  Age: 39 y.o.  Sex: female      Chief Complaint: Aphasia    HPI:   39 y.o. -H White (non-)  female consulted for ischemic stroke. She has a pmh of GERD, Crohns disease, HLD, Meningitis, PUD,demyelinating disease being worked up for multiple sclerosis by outpatient Neurology who initially presented to [place] ER with acute onset slurred speech and right sided facial numbness. Patient also reported chronic left sided weakness to LUE and LLE. LKWT  1205 PM on 12/20/24     Stroke code was activated. CT Head was negative for acute pathology. CT Angio Head and Neck showed R ICA occlusion vs stenosis with R M2 occlusion. CT Perfusion with large right hemispheric penumbra. At OSH NIHSS 5. Given her slurred speech was improving + chronic left sided weakness, TNK was not given.      It was decided to transfer patient to [facility] for EVT. Upon arrival to [facility] she was urgently taken to the angio suite. She underwent diagnostic cerebral angiogram w/mechanical thrombectomy + angioplasty and stenting of the R ICA. Procedure c/b thrombus accumulation in the stent which required in stent angioplasty + heparin and Integrilin. Repeat angiography showed no interval thrombus re accumulation. Cone beam CT obtained showed no ICH during the procedure.      Post procedure she

## 2024-12-22 ENCOUNTER — TELEPHONE (OUTPATIENT)
Facility: HOSPITAL | Age: 39
End: 2024-12-22

## 2024-12-22 VITALS
TEMPERATURE: 97.9 F | OXYGEN SATURATION: 99 % | HEART RATE: 69 BPM | SYSTOLIC BLOOD PRESSURE: 122 MMHG | RESPIRATION RATE: 18 BRPM | DIASTOLIC BLOOD PRESSURE: 79 MMHG

## 2024-12-22 DIAGNOSIS — I65.23 BILATERAL CAROTID ARTERY STENOSIS: Primary | ICD-10-CM

## 2024-12-22 LAB
ANION GAP SERPL CALC-SCNC: 5 MMOL/L (ref 2–12)
BUN SERPL-MCNC: 4 MG/DL (ref 6–20)
BUN/CREAT SERPL: 8 (ref 12–20)
CALCIUM SERPL-MCNC: 8.4 MG/DL (ref 8.5–10.1)
CHLORIDE SERPL-SCNC: 109 MMOL/L (ref 97–108)
CO2 SERPL-SCNC: 25 MMOL/L (ref 21–32)
CREAT SERPL-MCNC: 0.5 MG/DL (ref 0.55–1.02)
ERYTHROCYTE [DISTWIDTH] IN BLOOD BY AUTOMATED COUNT: 12.5 % (ref 11.5–14.5)
GLUCOSE SERPL-MCNC: 99 MG/DL (ref 65–100)
HCT VFR BLD AUTO: 27.6 % (ref 35–47)
HGB BLD-MCNC: 9.5 G/DL (ref 11.5–16)
MAGNESIUM SERPL-MCNC: 1.9 MG/DL (ref 1.6–2.4)
MCH RBC QN AUTO: 33 PG (ref 26–34)
MCHC RBC AUTO-ENTMCNC: 34.4 G/DL (ref 30–36.5)
MCV RBC AUTO: 95.8 FL (ref 80–99)
NRBC # BLD: 0 K/UL (ref 0–0.01)
NRBC BLD-RTO: 0 PER 100 WBC
PHOSPHATE SERPL-MCNC: 3.4 MG/DL (ref 2.6–4.7)
PLATELET # BLD AUTO: 133 K/UL (ref 150–400)
PMV BLD AUTO: 12.9 FL (ref 8.9–12.9)
POTASSIUM SERPL-SCNC: 3.1 MMOL/L (ref 3.5–5.1)
RBC # BLD AUTO: 2.88 M/UL (ref 3.8–5.2)
SODIUM SERPL-SCNC: 139 MMOL/L (ref 136–145)
WBC # BLD AUTO: 9.5 K/UL (ref 3.6–11)

## 2024-12-22 PROCEDURE — 6370000000 HC RX 637 (ALT 250 FOR IP)

## 2024-12-22 PROCEDURE — 92610 EVALUATE SWALLOWING FUNCTION: CPT

## 2024-12-22 PROCEDURE — 6370000000 HC RX 637 (ALT 250 FOR IP): Performed by: PHYSICIAN ASSISTANT

## 2024-12-22 PROCEDURE — 80048 BASIC METABOLIC PNL TOTAL CA: CPT

## 2024-12-22 PROCEDURE — 36415 COLL VENOUS BLD VENIPUNCTURE: CPT

## 2024-12-22 PROCEDURE — 2500000003 HC RX 250 WO HCPCS: Performed by: NURSE PRACTITIONER

## 2024-12-22 PROCEDURE — 99233 SBSQ HOSP IP/OBS HIGH 50: CPT | Performed by: NURSE PRACTITIONER

## 2024-12-22 PROCEDURE — 6370000000 HC RX 637 (ALT 250 FOR IP): Performed by: NURSE PRACTITIONER

## 2024-12-22 PROCEDURE — 84100 ASSAY OF PHOSPHORUS: CPT

## 2024-12-22 PROCEDURE — 85027 COMPLETE CBC AUTOMATED: CPT

## 2024-12-22 PROCEDURE — 99233 SBSQ HOSP IP/OBS HIGH 50: CPT | Performed by: STUDENT IN AN ORGANIZED HEALTH CARE EDUCATION/TRAINING PROGRAM

## 2024-12-22 PROCEDURE — 6360000002 HC RX W HCPCS: Performed by: PHYSICIAN ASSISTANT

## 2024-12-22 PROCEDURE — 83735 ASSAY OF MAGNESIUM: CPT

## 2024-12-22 RX ORDER — ATORVASTATIN CALCIUM 80 MG/1
80 TABLET, FILM COATED ORAL NIGHTLY
Qty: 30 TABLET | Refills: 3 | Status: SHIPPED | OUTPATIENT
Start: 2024-12-22

## 2024-12-22 RX ORDER — POTASSIUM CHLORIDE 750 MG/1
40 TABLET, EXTENDED RELEASE ORAL PRN
Status: DISCONTINUED | OUTPATIENT
Start: 2024-12-22 | End: 2024-12-22 | Stop reason: HOSPADM

## 2024-12-22 RX ORDER — DULOXETIN HYDROCHLORIDE 30 MG/1
30 CAPSULE, DELAYED RELEASE ORAL DAILY
Qty: 30 CAPSULE | Refills: 3 | Status: SHIPPED | OUTPATIENT
Start: 2024-12-22

## 2024-12-22 RX ORDER — ENOXAPARIN SODIUM 100 MG/ML
40 INJECTION SUBCUTANEOUS DAILY
Status: DISCONTINUED | OUTPATIENT
Start: 2024-12-22 | End: 2024-12-22 | Stop reason: HOSPADM

## 2024-12-22 RX ORDER — FAMOTIDINE 20 MG/1
20 TABLET, FILM COATED ORAL 2 TIMES DAILY
Status: DISCONTINUED | OUTPATIENT
Start: 2024-12-22 | End: 2024-12-22 | Stop reason: HOSPADM

## 2024-12-22 RX ORDER — METOCLOPRAMIDE HYDROCHLORIDE 5 MG/ML
5 INJECTION INTRAMUSCULAR; INTRAVENOUS EVERY 6 HOURS PRN
Status: DISCONTINUED | OUTPATIENT
Start: 2024-12-22 | End: 2024-12-22 | Stop reason: HOSPADM

## 2024-12-22 RX ORDER — POTASSIUM CHLORIDE 7.45 MG/ML
10 INJECTION INTRAVENOUS PRN
Status: DISCONTINUED | OUTPATIENT
Start: 2024-12-22 | End: 2024-12-22 | Stop reason: HOSPADM

## 2024-12-22 RX ORDER — ASPIRIN 81 MG/1
81 TABLET, CHEWABLE ORAL DAILY
Qty: 30 TABLET | Refills: 3 | Status: SHIPPED | OUTPATIENT
Start: 2024-12-23

## 2024-12-22 RX ADMIN — BUTALBITAL, ACETAMINOPHEN, AND CAFFEINE 1 TABLET: 50; 325; 40 TABLET ORAL at 08:06

## 2024-12-22 RX ADMIN — ENOXAPARIN SODIUM 40 MG: 100 INJECTION SUBCUTANEOUS at 12:15

## 2024-12-22 RX ADMIN — ASPIRIN 81 MG CHEWABLE TABLET 81 MG: 81 TABLET CHEWABLE at 08:06

## 2024-12-22 RX ADMIN — SODIUM CHLORIDE, PRESERVATIVE FREE 10 ML: 5 INJECTION INTRAVENOUS at 08:10

## 2024-12-22 RX ADMIN — FAMOTIDINE 20 MG: 20 TABLET, FILM COATED ORAL at 09:56

## 2024-12-22 RX ADMIN — POTASSIUM CHLORIDE 40 MEQ: 750 TABLET, FILM COATED, EXTENDED RELEASE ORAL at 05:20

## 2024-12-22 RX ADMIN — ACETAMINOPHEN 650 MG: 325 TABLET ORAL at 04:29

## 2024-12-22 RX ADMIN — TICAGRELOR 90 MG: 90 TABLET ORAL at 08:06

## 2024-12-22 ASSESSMENT — PAIN DESCRIPTION - LOCATION
LOCATION: HEAD

## 2024-12-22 ASSESSMENT — PAIN DESCRIPTION - DESCRIPTORS
DESCRIPTORS: ACHING
DESCRIPTORS: ACHING

## 2024-12-22 ASSESSMENT — PAIN SCALES - GENERAL
PAINLEVEL_OUTOF10: 0
PAINLEVEL_OUTOF10: 7
PAINLEVEL_OUTOF10: 6
PAINLEVEL_OUTOF10: 2
PAINLEVEL_OUTOF10: 0
PAINLEVEL_OUTOF10: 6

## 2024-12-22 NOTE — PROGRESS NOTES
[facility] Progress Note        Zohreh Bajwa  1985  [id number]  12/21/2024    BRIEF PATIENT SUMMARY AND HOSPITAL COURSE     Zohreh Bajwa is a 38 y/o female w/ Pmhx of GERD, Crohns, ?chronic left-sided weakness to left upper extremity and left lower extremity.of undergoing workup for demyelinating disease including multiple sclerosis as an outpatient with neurology, Presented to the ED with slurred speech and right-sided facial numbness.       Code stroke was called, workup including CT head was negative.  CT angiogram revealed right ICA occlusion versus stenosis with right M2 occlusion.  CT perfusion revealed a large right hemispheric penumbra.  TNK was not given.  Patient was transferred from [place] ED to [facility] for cerebral angiogram and mechanical thrombectomy with angioplasty and stenting of the right ICA.  During procedure, concern for thrombus accumulation in the stent which required in-stent angioplasty plus heparin and Integrilin.  Repeat angiography showed no interval thrombus reaccumulation.     Patient was transferred to the ICU, with persistent symptoms of headache, some blurred vision in the right visual field, and dysarthria.    12:21: Visual field abnormality persists. LUE weakness unchanged from chronic nature. Reports headache.    ASSESSMENT & PLAN     Right MCA stroke in settings of right ICA plus right M2 occlusion, S/P mechanical thrombectomy + R ICA angioplasty and stenting.   In stent thrombus - accumulation.   --Continue Heparin gtt per NIS to prevent stent thrombus re-accumulation   --Brillanta + ASA as per NIS   --Appreciate NIS/Neuro  --continue IV fluids.   --F/u Carotid duplex.  --On phenylepherine for BP support. Wean as able.     Chronic pain/Valium use,   --continue PRN    ICU DAILY CHECKLIST     Code Status: Full code  DVT Prophylaxis: Heparin gtt  T/L/D: PIV  SUP: Not indicated  Diet: ADAT  Activity Level: Bed rest  ABCDEF 
  Neurointerventional Surgery Progress Note  Neurocritical Care NP    Admit Date: 12/20/2024   LOS: 2 days      Daily Progress Note: 12/22/2024    Assessment:     Acute Ischemic CVA due to right ICA and right MCA occlusion s/p mechanical thrombectomy + right PERCY on 12/20/2024    Intractable Headache - improved, possible migraine in nature  Hypotension likely related to right PERCY - resolved     Plan:   - carotid stent patent on carotid duplex- moderate 50-69% of left ICA reported on duplex  - cont Aspirin 81 mg + Brilinta 90 mg BID for stent protection  - Continue Lipitor 80 mg QHS, lipid panel shows LDL 65.2, goal <70  - Hgb A1C ok at 5.6  - SBP goal   - ECHO with EF 65-70%  - Neurology following for stroke work up  - PT/OT evaluated patient and no needs identified  - Tylenol PRN for headache, added Reglan PRN, d/c Fioricet as this can cause rebound headaches  - Educated patient and patient family on stroke-like symptoms (BEFAST) and instructed to call 911 immediately. Educated on groin site precautions. Also informed to seek ER services if she experiences the worst headache of her life.  - follow up in NIS clinic in 2 weeks. Repeat carotid duplex in 3 months, message sent to office to assist with scheduling   - pt also needs to see ophthalmology outpatient for vision issues in right eye  - f/u with Neurology in clinic in 6-8 weeks, hypercoag work up per Neurology   - ok to discharge home today from [facility] standpoint       Activity: Up with assistance   DVT ppx: SCDs, Lovenox  Dispo: TBD    Plan d/w Dr. Lane, Intensivist, RN, patient, and patient's .      HPI: Zohreh Bajwa is a 39 y.o. female with a PMH of GERD, Crohns disease, HLD, Meningitis, PUD, demyelinating disease being worked up for multiple sclerosis by outpatient Neurology who initially presented to [place] [facility] on 12/20/2024 with acute onset of slurred speech and right sided facial numbness. Patient also reported chronic left sided weakness 
1810 - patient admitted from Angio - drowsy but oriented x4, able to move all extremities to command, no droop, mild dysarthria.     1830 - patient c/o loss of vision in 4 small spots in R eye.     1900 - small amount of oozing from groin site, manual pressure held x 5 minutes. No bruising or hematoma.   
20:00 changes with patient's vision on right eye, stated she was seeing straight line. Neuro NP informed.  
Code Stroke Documentation      Symptoms: Left facial droop, slurred speech    Baseline mRS:  0   Last Known Well: 1200    Medical hx: Past Medical History:   Diagnosis Date    Arrhythmia     TACHYCARDIA    Autoimmune disease (HCC)     Chronic pain     Crohn's disease (HCC)     Dizziness     Dysautonomia (HCC)     GERD (gastroesophageal reflux disease)     Headache     Hyperlipidemia     Hypertriglyceridemia     Irregular heartbeat     Meningitis     MS (multiple sclerosis) (HCC)     Psychiatric disorder     ANXIETY    PUD (peptic ulcer disease)     DUODENAL    Sinus problem       Vitals: There were no vitals filed for this visit.   AC/APT: None    VAN: Positive   NIHSS: 1a-LOC:0  1b-Month/Age:0  1c-Open/Close Hand:0  2-Best Gaze:0  3-Visual Fields:0  4-Facial Palsy:1  5a-Left Arm:1  5b-Right Arm:0  6a-Left Le  6b-Right Le  7-Limb Ataxia:0  8-Sensory:1 ( decreased to light touch to the right)   9-Best Language:0  10-Dysarthria:1  11-Extinction/Inattention:0  TOTAL SCORE: 6   Imaging (personally reviewed): CT Head: No acute pathology     CT Angio Head and Neck + CT Perfusion:  1. Short segment occlusion of the right carotid bulb. Weak distal ICA reconstitution.  2. Acute thrombosis/occlusion of the distal right M1. Reconstituted anterior and posterior M2 branches, weak reconstitution of the middle branch.  3. Moderate to large ischemic penumbra in the right MCA territory. No clear infarction yet.   Plan: tNK Candidate: NO  Mechanical thrombectomy Candidate: YES, patient transferred from [place] [facility] to [facility] for urgent DSA w/mechanical thrombectomy and possible right carotid stenting     *Perform dysphagia screening prior to any PO intake*     Discussed with: NIS attending Dr. Lane     Arrival time: 1430     Vonnie Smalls, APRN - CNP  Neurovascular Nurse Practitioner    
Neurovascular Brief Progress Note:    # R MCA Stroke in the setting of R ICA + R M2 Occlusion, s/p mechanical thrombectomy + R ICA angioplasty and stenting by Dr. Lane on 12/20/24   #In stent thrombus accumulation     Patient is seen with spouse and primary RN at bedside.  Patient is anxious and describes being with a patient in Family Practice today and suddenly not being able to speak to them.    Patient does state that since last speaking with the Neuro NP on day shift her vision in her right eye has evolved from tiny fuzzy spots in her visual field to a line, like the spots have now connected. She states she also has right face numbness / decreased sensation that has returned and is able to localize it from mid face / cheek down the right side of her neck, but does not extend to the shoulder or collar bone.  She also states her right leg feels heavy, but denies decrease in sensation. She denies heaviness or weakness to her right arm.  She does state she has a right sided headache.  She denies speech difficulty difficulty or word finding, nausea, vomiting, abdominal pain, acute extremity weakness with exception to the right leg \"heaviness\".     Heparin drip is running at  12 units/kg/hr, (1751) initial Xa was > 1.50 and aPTT is >130.0, redraw due at midnight.      Dr. Lane has been updated on current exam and changes from previous exam    Physical Exam:  Gen: NAD, calm, cooperative  Neuro: A&Ox4. Follows commands. Speech clear. Affect normal. PERRL, 3 mm bilaterally. Blinks to threat. No disconjugate gaze present. EOMI. Face symmetric. Palate symmetric. Shiva spontaneously. Strength 5/5 in LUE and RUE BL. Strength is 4-/5 to the RLE and 4/5 to the LLE, negative drift BL. Bulk and tone normal. No involuntary movements. Gait deferred.  Skin: Warm, dry, color appropriate for ethnicity. R groin arteriotomy site soft and non-tender on palpation, dressing is C/D/I, with no active bleeding or drainage noted.  There 
Neurovascular Brief Progress Note:    Acute Ischemic CVA due to right ICA and right MCA occlusion s/p mechanical thrombectomy + right PERCY on 12/20/2024     Intractable Headache   Hypotension likely related to right PERCY    Patient is sitting up on the side of the bed.  She is alert and cooperative with exam.  She endorses a small gray line that is persistent in her right eye's field of vision and a mild dull headache that is currently 2/10.  She also endorses decreased sensation on the right side of her check that does not extend beyond the jaw line. She denies back pain, chest pain, dizziness, difficulty speaking or with word finding, acute extremity weakness, numbness or tingling.      Physical Exam:  Gen: NAD, calm, cooperative  Neuro: A&Ox4. Follows commands. Speech clear. Affect normal. PERRL, 3 mm bilaterally. Blinks to threat. No disconjugate gaze present. EOMI. Face symmetric. Palate symmetric. Tongue midline. Shiva spontaneously. Strength 5/5 in RUE and RLE, 4+/5 in LUE and LLE. Negative drift B/L. Bulk and tone normal. No involuntary movements. Gait deferred.  Skin: Warm, dry, color appropriate for ethnicity. Right groin arteriotomy site is C/D/I.  Scant bruising.  No hematoma or bleeding noted.    Continue NIS Plan:  - Heparin gtt discontinued.  - carotid duplex preliminary results reveal no in stent stenosis  - cont Aspirin 81 mg + Brilinta 90 mg BID for stent protection  - Continue Lipitor 80 mg QHS, lipid panel shows LDL 65.2, goal <70  - Hgb A1C ok at 5.6  - SBP goal , Kan stopped - current SBPs within goal.  - ECHO reveals EF 65-70%, bubble study negative  - Neuro checks every 2 hours, stable over 24 hrs.   - IV fluids stopped, +753.7 since admission   - Neurology following  for stroke work up and hypercoag work up pending  - PT/OT evals documented and signed off, recommending out patient therapy for left sided deficits and vision.      Fara Sanches, APRN - NP  Neurovascular Nurse 
OCCUPATIONAL THERAPY EVALUATION/DISCHARGE  Patient: Zohreh Bajwa (39 y.o. female)  Date: 12/21/2024  Primary Diagnosis: Acute right MCA stroke (HCC) [I63.511]  Ischemic stroke (HCC) [I63.9]  Cerebrovascular accident (CVA) due to occlusion of right middle cerebral artery (HCC) [I63.511]         Precautions:                    ASSESSMENT :  Based on the objective data below, the patient presents with deficits on left side of body that are not new this admission, present x6 weeks and patient being worked up by outpatient neurology, does have right visual deficits (horizontal, thin line of decreased vision on right eye only), otherwise right sided deficits have resolved. Patient initially presented speech difficulties and right sided face numbness and right sided weakness. Patient underwent diagnostic cerebral angiogram, angioplasty and stenting of right carotid artery, and transarterial mechanical thrombectomy 12/20. Patient received semi fowlers in bed and agreeable to working with therapy. Patient donned pants in bed prior to mobility and donned slip on shoes at EOB. Patient stood and ambulating into hallway with independence, no balance deficits noted at this time. Patient returned to room and completed grooming at the sink prior to returning to bed per patient request. Patient with left sided decreased strength, coordination and sensation and minimal right visual deficits, no further acute OT needs at this time as she remains at her independent baseline for self care and mobility. Recommend discharge home with OP OT to address visual and left sided deficits.     Functional Outcome Measure:  AM-PAC Inpatient Daily Activity Raw Score: 24/24 which is indicative of Cutoff score 39.4 (19) correlates to a good likelihood of discharging home versus a facility.      Further skilled acute occupational therapy is not indicated at this time.     PLAN :  Recommend with staff: up ad merlin in room, up to bathroom for 
Occupational Therapy  12/20/24    Order acknowledged, chart reviewed. Patient currently KATHY for emergent DSA with thrombectomy and possible R carotid stenting. OT will follow up over the weekend as able/appropriate.     Thank you,   KATHLEEN Cr, OTR/L    
PHYSICAL THERAPY EVALUATION/DISCHARGE    Patient: Zohreh Bajwa (39 y.o. female)  Date: 12/21/2024  Primary Diagnosis: Acute right MCA stroke (HCC) [I63.511]  Ischemic stroke (HCC) [I63.9]  Cerebrovascular accident (CVA) due to occlusion of right middle cerebral artery (HCC) [I63.511]       Precautions:  (SBP )                      ASSESSMENT AND RECOMMENDATIONS:  Based on the objective data below, the patient presented on 12/20 with L side face numbness, headache, and slurred speech. Pt found to have R MCA Stroke in the setting of R ICA + R M2 Occlusion, s/p mechanical thrombectomy + R ICA angioplasty and stenting on 12/20/24.  Pt demonstrated 4-/5 LLE strength and impaired sensation LUE, which patient reported is baseline for the past 6 weeks and is being worked up for demyelinating disease as an outpatient. Pt reported newly impaired vision in R eye (see OT note for details). Pt demonstrated independence for bed mobility, functional transfers, and gait training without AD. Pt denied dizziness and no noted LOB during upright activity. Pt currently mobilizing at baseline mobility status with no further acute PT needs.    Functional Outcome Measure:  The patient scored 55/56 on the velasquez balance outcome measure which is indicative of low risk for falls.      Further skilled acute physical therapy is not indicated at this time.       PLAN :  Recommendation for discharge: (in order for the patient to meet his/her long term goals):   Outpatient physical therapy for L sided weakness    Other factors to consider for discharge: no additional factors    IF patient discharges home will need the following DME: none       SUBJECTIVE:   Patient stated “I'm feeling all right.”    OBJECTIVE DATA SUMMARY:     Past Medical History:   Diagnosis Date    Arrhythmia     TACHYCARDIA    Autoimmune disease (HCC)     Chronic pain     Crohn's disease (HCC)     Dizziness     Dysautonomia (HCC)     GERD (gastroesophageal reflux 
Physical Therapy 12/20/2024    Orders received and chart reviewed up to date. Pt KATHY for procedure. Will follow-up tomorrow for PT evaluation/ as appropriate.     Thank you.  Anna Klein, PT, DPT    
Radiologist MD called regarding the patient's MRI from this morning. MD stated there were \"tiny subacute infarcts with small petechial hemorrhage along with artifact in the CSF that could be related to a SAH.\" MD findings relayed to ARELY Sanches.    
Speech pathology note  Reviewed chart and note patient off the floor for urgent DSA w/mechanical thrombectomy and possible right carotid stenting. SLP will hold at this time and follow as medically appropriate. Thank you.    VAMSI Woodward Ed., CCC-SLP   
Functional  Aspiration Signs/Symptoms: None  Pharyngeal Phase Characteristics: No impairment, issues, or problems  Effective Modifications: None  Oral Phase  Oral Phase - Comment: WFL  Pharyngeal Phase Comment: WFL    Respiratory Status/Airway:  Room air                         Outcome Measure:  Functional Oral Intake Scale (FOIS): 7--Total oral diet with no restrictions    After treatment:   Call bell left within reach and Nursing notified    COMMUNICATION/EDUCATION:       The patient's plan of care including recommendations, planned interventions, and recommended diet changes were discussed with: Registered nurse    Patient/family have participated as able in goal setting and plan of care    Thank you,  Latricia Rebolledo, SLP          
branch. 3. Moderate to large ischemic penumbra in the right MCA territory. No clear infarction yet. The findings were conveyed via secure electronic text message (Arvirago) to Dr. Joie Ghotra on 12/20/2024 1:47 PM by Dr. Robin Mandel.  789 Electronically signed by Robin Mandel     CT HEAD WO CONTRAST     Result Date: 12/20/2024  No acute intracranial abnormality. Electronically signed by Mike Hearn        AP:  38 yo F h/o Crohn's disease, ?demyelinating disease, Meningitis, anxiety, smoking (1 PPD), HLD presenting with speech changes, R facial numbness. CT neuroimaging showed short segment occlusion of R carotid bulb with weak distal reconstitution but also with acute occlusion of distal R M1 with distal reconstitution. Transferred to [facility] for EVT. On arrival, noted to have L facial droop and slurred speech as well as diminished touch on the R and drift throughout except for R arm. Taken for thrombectomy, s/p angioplasty and stenting of R ICA and aspiration thrombectomy of R MCA. Stent rethrombosed, given heparin and Integrilin and showed no re-accumulation. Also given  mg and 180 mg of Brillinta. Started on heparin gtt and transferred to ICU. Dysarthria improved overnight, endorses blurring of vision in the center of her R eye but has improved somewhat. MRI brain showed small scattered infarcts in R MCA with possibly some petechial hemorrhage. There was question of SAH seen though not noted on CTH. On exam, patient has mild weakness of LUE and blurring/greying in center of vision in R eye only. Etiology of stroke unclear, possibly atherosclerotic with artery-to-artery emboli vs cardioembolic vs hypercoagulable state.    12/22:  Heparin drip stopped overnight. Symptoms remain stable to mildly improved. Feels that LUE is slightly stronger and less numb. Visual change in R eye persists. Patient eager to go home. PT recommends outpatient therapy.     - Continue DAPT  - Start atorvastatin 
anterior right parietal lobe. Chronic microhemorrhage right  occipital lobe and possibly mesial right temporal lobe. Sulcal FLAIR  hyperintense signal in the right parietal and temporal occipital regions likely  artifactual or related to sulcal vessels, with no definite corresponding  susceptibility artifact.  Basal Cisterns:  Normal.  Flow Voids:  Normal.  Additional Comments:  N/A.    Impression  Few small foci of acute infarction in the superior right frontal lobe/centrum  semiovale and right insula, without mass effect. Suspect small amounts of  associated petechial hemorrhage as above. Probable artifactual FLAIR  hyperintensity in the right parietal, temporal and occipital sulci, though  consider head CT for confirmation.    The findings were called to ICU charge nurse Magali on 2024 at 0705 by  myself.    789.      Electronically signed by Silvestre Sim    Most Recent Echo  No results found for this or any previous visit.      /81   Pulse 77   Temp 98.4 °F (36.9 °C) (Oral)   Resp 17   SpO2 99%      Temp (24hrs), Av.3 °F (36.8 °C), Min:98 °F (36.7 °C), Max:98.5 °F (36.9 °C)           Intake/Output:     Intake/Output Summary (Last 24 hours) at 2024 1044  Last data filed at 2024 2221  Gross per 24 hour   Intake 603.19 ml   Output 1175 ml   Net -571.81 ml         CCM time:   30 mins.  This patient is critically ill with risk of clinical deterioration.  The documented time was time spent providing direct patient care, formulating care plan and discussing this plan with other providers, updating family and discussing goals of care with patient and/or family. It does not include time spent performing any procedures that are billed separtately.    Praneeth Grewal PA-C    Critical Care Medicine  2024      
middle branch.  Moderate to large ischemic penumbra in the right MCA territory. No clear  infarction yet.  CT[facility] 12/21/2024 at 0719: No hemorrhage or other acute intracranial finding by CT.     MRI of Brain: Few small foci of acute infarction in the superior right frontal lobe/centrum semiovale and right insula, without mass effect. Suspect small amounts of associated petechial hemorrhage as above. Probable artifactual FLAIR hyperintensity in the right parietal, temporal and occipital sulci.    ECHO: pending    I have spent 50 minutes of time involved in chart review, lab review, imaging review, consultations with specialists and attendings, family discussion/decision making and documentation.     Signed By: ORTIZ Mojica NP, Neurovascular Nurse Practitioner    December 21, 2024

## 2024-12-22 NOTE — DISCHARGE SUMMARY
[facility]                                                                                         Discharge Summary     Patient: Zohreh Bajwa       MRN: [id number]       YOB: 1985       Age: 39 y.o.     Date of admission:  12/20/2024    Date of discharge:  12/22/2024    Primary care provider:  Camden Genao MD     Admitting provider:  Ubaldo Zarate MD    Discharging provider(s): BLOSSOM PRYOR PA-C - Staff Intensivist - [facility]     Consultations  IP CONSULT TO NEUROLOGY  IP CONSULT TO CASE MANAGEMENT  IP CONSULT TO STROKE COORDINATOR  IP CONSULT TO NEUROINTERVENTIONAL SURGERY      Procedures    Underwent the following procedures by Neurointerventional Surgery Dr. Lane on 12/20/2024  Diagnostic cerebral angiogram  Angioplasty and stenting of right carotid artery  Transarterial mechanical thrombectomy, CNS: Right ICA, Right MCA  Post-stent angioplasty  Intra-arterial administration of a lytic agent: Integrilin administration into right ICA  Control angiography  Placement of arteriotomy closure device    Discharge destination: Home.  The patient is stable for discharge.    Admission diagnosis  Acute right MCA stroke (HCC) [I63.511]  Ischemic stroke (HCC) [I63.9]  Cerebrovascular accident (CVA) due to occlusion of right middle cerebral artery (HCC) [I63.511]    Please refer to the admission history and physical for details on the presenting problem.     Final discharge diagnoses and brief hospital course    Right MCA stroke in settings of right ICA plus right M2 occlusion, S/P mechanical thrombectomy + R ICA angioplasty and stenting 12/20  In stent thrombus - accumulation.     Zohreh Bajwa is a 38 y/o female w/ Pmhx of GERD, Crohns, ?chronic left-sided weakness to left upper extremity and left lower extremity.of undergoing workup for demyelinating disease including multiple sclerosis as an outpatient with neurology, Presented to the ED with

## 2024-12-22 NOTE — DISCHARGE INSTRUCTIONS
ACTIVITY:   Do not lift anything over 10 lbs for two weeks. Try to limit going up and down stairs as much as possible. Rest and hydrate. May resume all physical activities as tolerated after 2 weeks.     WOUND CARE:   Monitor for signs and sx of infection including fever, expanding inflammation and discolored drainage. Report any changes in temperature in affected extremity, numbness, weakness or hematoma.  If you experience any increased bruising or bleeding at your puncture site either outside or under the skin please apply direct, firm pressure for 20 minutes. Keep track of the bruising at the site by marking it with a pen or marker. If the bruising continues to be dark purple or blue in color, OR is expanding, please call our office to let the on call doctor know. We have someone on call 24/7. You may shower normally and cleanse the site with gentle soap. Do not soak in the bathtub. Do not apply any lotions, creams, powders, or other cosmetic products to the site.     OTHER RECOMMENDATIONS:  BEFAST reviewed to educate for stroke symptoms. Please call 911 and proceed to emergency department immediately if you develop any of the following:  - Acute changes in your balance or vision  - Weakness in your face, arm or leg   - Speech changes or difficulties.  Seek ER services if patient experiences the worst headache of her life.     Avoid NSAIDS including (Advil, Motrin, Ibuprofen, Celebrex etc) while you are taking Aspirin and Brilinta/Plavix. You may take Tylenol for pain.

## 2024-12-22 NOTE — PLAN OF CARE
Problem: Pain  Goal: Verbalizes/displays adequate comfort level or baseline comfort level  Outcome: Progressing     Problem: Safety - Adult  Goal: Free from fall injury  Outcome: Progressing     Problem: Discharge Planning  Goal: Discharge to home or other facility with appropriate resources  Outcome: Progressing     Problem: Chronic Conditions and Co-morbidities  Goal: Patient's chronic conditions and co-morbidity symptoms are monitored and maintained or improved  Outcome: Progressing

## 2024-12-23 ENCOUNTER — TELEPHONE (OUTPATIENT)
Age: 39
End: 2024-12-23

## 2024-12-23 LAB
ANA SER QL: NEGATIVE
AT III PPP CHRO-ACNC: 92 % (ref 75–135)
C-ANCA TITR SER IF: NORMAL TITER
MYELOPEROXIDASE AB SER IA-ACNC: <0.2 UNITS (ref 0–0.9)
P-ANCA ATYPICAL TITR SER IF: NORMAL TITER
P-ANCA TITR SER IF: NORMAL TITER
PROT C PPP-ACNC: 109 % (ref 73–180)
PROT S ACT/NOR PPP: 48 % (ref 63–140)
PROT S AG ACT/NOR PPP IA: 67 % (ref 60–150)
PROT S FREE AG ACT/NOR PPP IA: 74 % (ref 61–136)
PROTEINASE3 AB SER IA-ACNC: <0.2 UNITS (ref 0–0.9)

## 2024-12-23 NOTE — TELEPHONE ENCOUNTER
Patient left a message wanting to schedule her NIS follow up appointment.  I called the patient back but had to leave a voice message.    I have tentatively scheduled this patient on 1/6/2025 with Funmi Jo.

## 2024-12-24 LAB
ECHO BSA: 1.73 M2
VAS LEFT CCA DIST EDV: 22.6 CM/S
VAS LEFT CCA DIST PSV: 89.7 CM/S
VAS LEFT CCA PROX EDV: 28.5 CM/S
VAS LEFT CCA PROX PSV: 116.1 CM/S
VAS LEFT ECA EDV: 23 CM/S
VAS LEFT ECA PSV: 130.8 CM/S
VAS LEFT ICA DIST EDV: 49.8 CM/S
VAS LEFT ICA DIST PSV: 120.3 CM/S
VAS LEFT ICA MID EDV: 56 CM/S
VAS LEFT ICA MID PSV: 157.1 CM/S
VAS LEFT ICA PROX EDV: 43 CM/S
VAS LEFT ICA PROX PSV: 177.8 CM/S
VAS LEFT ICA/CCA PSV: 2 NO UNITS
VAS LEFT VERTEBRAL EDV: 26.7 CM/S
VAS LEFT VERTEBRAL PSV: 66.4 CM/S
VAS RIGHT CCA DIST EDV: 32.1 CM/S
VAS RIGHT CCA DIST PSV: 75.1 CM/S
VAS RIGHT CCA PROX EDV: 29.5 CM/S
VAS RIGHT CCA PROX PSV: 92 CM/S
VAS RIGHT ECA EDV: 37.6 CM/S
VAS RIGHT ECA PSV: 253.3 CM/S
VAS RIGHT ICA DIST EDV: 34.5 CM/S
VAS RIGHT ICA DIST PSV: 76.2 CM/S
VAS RIGHT ICA MID EDV: 32.2 CM/S
VAS RIGHT ICA MID PSV: 90.9 CM/S
VAS RIGHT ICA PROX EDV: 25.5 CM/S
VAS RIGHT ICA PROX PSV: 76.2 CM/S
VAS RIGHT ICA/CCA PSV: 1.2 NO UNITS
VAS RIGHT VERTEBRAL EDV: 26.6 CM/S
VAS RIGHT VERTEBRAL PSV: 76.2 CM/S

## 2024-12-26 LAB
F5 P.R506Q BLD/T QL: NORMAL
IMP & REVIEW OF LAB RESULTS: NORMAL

## 2024-12-30 LAB
F2 GENE MUT ANL BLD/T: NORMAL
IMP & REVIEW OF LAB RESULTS: NORMAL

## 2025-01-13 LAB
APTT HEX PL PPP: 0 SEC
APTT IMM NP PPP: 28.7 SEC
APTT PPP 1:1 SALINE: 39.7 SEC
APTT PPP: 34 SEC
B2 GLYCOPROT1 IGA SER-ACNC: <10 SAU
B2 GLYCOPROT1 IGG SER-ACNC: <10 SGU
B2 GLYCOPROT1 IGM SER-ACNC: <10 SMU
CARDIOLIPIN IGA SER IA-ACNC: <10 APL
CARDIOLIPIN IGG SER IA-ACNC: <10 GPL
CARDIOLIPIN IGM SER IA-ACNC: <10 MPL
CONFIRM APTT: 12 SEC
CONFIRM DRVVT: ABNORMAL SEC
LABORATORY COMMENT REPORT: ABNORMAL
PROTHROM IGG SERPL-ACNC: 2 G UNITS
PS IGG SER IA-ACNC: 0 GPS
PS IGM SER IA-ACNC: 0 MPS
SCREEN DRVVT/NORMAL: ABNORMAL RATIO
SCREEN DRVVT: 46.4 SEC

## 2025-01-20 ENCOUNTER — OFFICE VISIT (OUTPATIENT)
Age: 40
End: 2025-01-20
Payer: COMMERCIAL

## 2025-01-20 VITALS
WEIGHT: 142 LBS | OXYGEN SATURATION: 98 % | DIASTOLIC BLOOD PRESSURE: 72 MMHG | TEMPERATURE: 98.3 F | HEART RATE: 93 BPM | HEIGHT: 63 IN | SYSTOLIC BLOOD PRESSURE: 110 MMHG | BODY MASS INDEX: 25.16 KG/M2

## 2025-01-20 DIAGNOSIS — I65.21 RIGHT INTERNAL CAROTID OCCLUSION: Primary | ICD-10-CM

## 2025-01-20 PROCEDURE — 99213 OFFICE O/P EST LOW 20 MIN: CPT | Performed by: NURSE PRACTITIONER

## 2025-01-20 RX ORDER — FAMOTIDINE 20 MG/1
20 TABLET, FILM COATED ORAL DAILY
COMMUNITY
Start: 2024-12-23

## 2025-01-20 NOTE — PROGRESS NOTES
New [facility] procedure follow up for Carotid artery occlusion and s/p CVA.  Patient reports residual vision changes and numbness on her left side from stroke.  No new problems reported.  Reports right groin puncture site healed without difficulty.  
defect    TONSILLECTOMY  2001      Family History   Problem Relation Age of Onset    Stroke Paternal Grandmother     Diabetes Paternal Grandmother     Mult Sclerosis Maternal Grandmother     Cancer Maternal Uncle         COLON    Cancer Maternal Grandfather         COLON    Hypertension Paternal Grandmother     Hypertension Paternal Grandfather     Hypertension Mother     Post-op Nausea/Vomiting Sister     Inflam Bowel Dis Mother     Diabetes Mother      Social History     Tobacco Use    Smoking status: Every Day     Current packs/day: 0.50     Types: Cigarettes    Smokeless tobacco: Never   Substance Use Topics    Alcohol use: No      Current Outpatient Medications   Medication Sig Dispense Refill    atorvastatin (LIPITOR) 80 MG tablet Take 1 tablet by mouth nightly 30 tablet 3    aspirin 81 MG chewable tablet Take 1 tablet by mouth daily 30 tablet 3    DULoxetine (CYMBALTA) 30 MG extended release capsule Take 1 capsule by mouth daily 30 capsule 3    ticagrelor (BRILINTA) 90 MG TABS tablet Take 1 tablet by mouth 2 times daily 60 tablet 5    butalbital-acetaminophen-caffeine-codeine (FIORICET WITH CODEINE) -98-30 MG per capsule Take 1 capsule by mouth every 4 hours as needed.      vitamin D (CHOLECALCIFEROL) 52081 UNIT CAPS Take by mouth every 7 days      diazePAM (VALIUM) 5 MG tablet Take 5 mg by mouth every 6 hours as needed.      estradiol (ESTRACE) 2 MG tablet Take by mouth daily      fenofibrate (TRICOR) 145 MG tablet Take by mouth daily      meloxicam (MOBIC) 7.5 MG tablet Take 7.5 mg by mouth daily as needed      ondansetron (ZOFRAN-ODT) 8 MG TBDP disintegrating tablet Take 8 mg by mouth every 8 hours as needed      propranolol (INDERAL) 20 MG tablet Take 20 mg by mouth 3 times daily      valACYclovir (VALTREX) 500 MG tablet Take by mouth 2 times daily       No current facility-administered medications for this visit.        Allergies   Allergen Reactions    Nifedipine Anaphylaxis and Swelling

## 2025-01-20 NOTE — PATIENT INSTRUCTIONS
Follow up the end of March 2025 after imaging is completed.  See attached paperwork to schedule imaging.

## 2025-01-30 ENCOUNTER — TELEPHONE (OUTPATIENT)
Age: 40
End: 2025-01-30

## 2025-02-02 ENCOUNTER — APPOINTMENT (OUTPATIENT)
Facility: HOSPITAL | Age: 40
End: 2025-02-02
Payer: COMMERCIAL

## 2025-02-02 ENCOUNTER — HOSPITAL ENCOUNTER (OUTPATIENT)
Facility: HOSPITAL | Age: 40
Setting detail: OBSERVATION
Discharge: HOME | End: 2025-02-02
Attending: STUDENT IN AN ORGANIZED HEALTH CARE EDUCATION/TRAINING PROGRAM | Admitting: FAMILY MEDICINE
Payer: COMMERCIAL

## 2025-02-02 VITALS
OXYGEN SATURATION: 99 % | SYSTOLIC BLOOD PRESSURE: 133 MMHG | HEIGHT: 63 IN | TEMPERATURE: 98.4 F | DIASTOLIC BLOOD PRESSURE: 95 MMHG | WEIGHT: 145 LBS | BODY MASS INDEX: 25.69 KG/M2 | HEART RATE: 92 BPM | RESPIRATION RATE: 15 BRPM

## 2025-02-02 DIAGNOSIS — H53.9 VISION CHANGES: ICD-10-CM

## 2025-02-02 DIAGNOSIS — R20.0 NUMBNESS: ICD-10-CM

## 2025-02-02 DIAGNOSIS — R51.9 ACUTE NONINTRACTABLE HEADACHE, UNSPECIFIED HEADACHE TYPE: Primary | ICD-10-CM

## 2025-02-02 LAB
ALBUMIN SERPL-MCNC: 5 G/DL (ref 3.5–5.2)
ALBUMIN/GLOB SERPL: 1.7 (ref 1.1–2.2)
ALP SERPL-CCNC: 71 U/L (ref 35–104)
ALT SERPL-CCNC: 11 U/L (ref 10–35)
ANION GAP SERPL CALC-SCNC: 12 MMOL/L (ref 2–12)
AST SERPL-CCNC: 19 U/L (ref 10–35)
BASOPHILS # BLD: 0.05 K/UL (ref 0–0.1)
BASOPHILS NFR BLD: 0.4 % (ref 0–1)
BILIRUB SERPL-MCNC: 0.4 MG/DL (ref 0.2–1)
BUN SERPL-MCNC: 9 MG/DL (ref 6–20)
BUN/CREAT SERPL: 17 (ref 12–20)
CALCIUM SERPL-MCNC: 9.8 MG/DL (ref 8.6–10)
CHLORIDE SERPL-SCNC: 101 MMOL/L (ref 98–107)
CO2 SERPL-SCNC: 26 MMOL/L (ref 22–29)
CREAT SERPL-MCNC: 0.54 MG/DL (ref 0.5–0.9)
DIFFERENTIAL METHOD BLD: ABNORMAL
EOSINOPHIL # BLD: 0.18 K/UL (ref 0–0.4)
EOSINOPHIL NFR BLD: 1.5 % (ref 0–7)
ERYTHROCYTE [DISTWIDTH] IN BLOOD BY AUTOMATED COUNT: 13 % (ref 11.5–14.5)
FLUAV RNA SPEC QL NAA+PROBE: NOT DETECTED
FLUBV RNA SPEC QL NAA+PROBE: NOT DETECTED
GLOBULIN SER CALC-MCNC: 3 G/DL (ref 2–4)
GLUCOSE SERPL-MCNC: 105 MG/DL (ref 65–100)
HCT VFR BLD AUTO: 42.2 % (ref 35–47)
HGB BLD-MCNC: 14.5 G/DL (ref 11.5–16)
IMM GRANULOCYTES # BLD AUTO: 0.03 K/UL (ref 0–0.04)
IMM GRANULOCYTES NFR BLD AUTO: 0.3 % (ref 0–0.5)
LYMPHOCYTES # BLD: 4.64 K/UL (ref 0.8–3.5)
LYMPHOCYTES NFR BLD: 39.1 % (ref 12–49)
MCH RBC QN AUTO: 33 PG (ref 26–34)
MCHC RBC AUTO-ENTMCNC: 34.4 G/DL (ref 30–36.5)
MCV RBC AUTO: 95.9 FL (ref 80–99)
MONOCYTES # BLD: 0.49 K/UL (ref 0–1)
MONOCYTES NFR BLD: 4.1 % (ref 5–13)
NEUTS SEG # BLD: 6.48 K/UL (ref 1.8–8)
NEUTS SEG NFR BLD: 54.6 % (ref 32–75)
NRBC # BLD: 0 K/UL (ref 0–0.01)
NRBC BLD-RTO: 0 PER 100 WBC
PLATELET # BLD AUTO: 198 K/UL (ref 150–400)
PMV BLD AUTO: 12.2 FL (ref 8.9–12.9)
POTASSIUM SERPL-SCNC: 3.7 MMOL/L (ref 3.5–5.1)
PROT SERPL-MCNC: 8 G/DL (ref 6.4–8.3)
RBC # BLD AUTO: 4.4 M/UL (ref 3.8–5.2)
SARS-COV-2 RNA RESP QL NAA+PROBE: NOT DETECTED
SODIUM SERPL-SCNC: 139 MMOL/L (ref 136–145)
SOURCE: NORMAL
WBC # BLD AUTO: 11.9 K/UL (ref 3.6–11)

## 2025-02-02 PROCEDURE — 82962 GLUCOSE BLOOD TEST: CPT

## 2025-02-02 PROCEDURE — 0042T CT BRAIN PERFUSION: CPT

## 2025-02-02 PROCEDURE — 96375 TX/PRO/DX INJ NEW DRUG ADDON: CPT

## 2025-02-02 PROCEDURE — 87636 SARSCOV2 & INF A&B AMP PRB: CPT

## 2025-02-02 PROCEDURE — 85025 COMPLETE CBC W/AUTO DIFF WBC: CPT

## 2025-02-02 PROCEDURE — 6360000002 HC RX W HCPCS: Performed by: STUDENT IN AN ORGANIZED HEALTH CARE EDUCATION/TRAINING PROGRAM

## 2025-02-02 PROCEDURE — 70450 CT HEAD/BRAIN W/O DYE: CPT

## 2025-02-02 PROCEDURE — 99285 EMERGENCY DEPT VISIT HI MDM: CPT

## 2025-02-02 PROCEDURE — 93005 ELECTROCARDIOGRAM TRACING: CPT | Performed by: STUDENT IN AN ORGANIZED HEALTH CARE EDUCATION/TRAINING PROGRAM

## 2025-02-02 PROCEDURE — 36415 COLL VENOUS BLD VENIPUNCTURE: CPT

## 2025-02-02 PROCEDURE — 80053 COMPREHEN METABOLIC PANEL: CPT

## 2025-02-02 PROCEDURE — 70498 CT ANGIOGRAPHY NECK: CPT

## 2025-02-02 PROCEDURE — 6360000004 HC RX CONTRAST MEDICATION: Performed by: STUDENT IN AN ORGANIZED HEALTH CARE EDUCATION/TRAINING PROGRAM

## 2025-02-02 PROCEDURE — 96365 THER/PROPH/DIAG IV INF INIT: CPT

## 2025-02-02 RX ORDER — MAGNESIUM SULFATE 1 G/100ML
1000 INJECTION INTRAVENOUS ONCE
Status: COMPLETED | OUTPATIENT
Start: 2025-02-02 | End: 2025-02-02

## 2025-02-02 RX ORDER — DEXAMETHASONE SODIUM PHOSPHATE 4 MG/ML
4 INJECTION, SOLUTION INTRA-ARTICULAR; INTRALESIONAL; INTRAMUSCULAR; INTRAVENOUS; SOFT TISSUE ONCE
Status: COMPLETED | OUTPATIENT
Start: 2025-02-02 | End: 2025-02-02

## 2025-02-02 RX ORDER — DIPHENHYDRAMINE HYDROCHLORIDE 50 MG/ML
12.5 INJECTION INTRAMUSCULAR; INTRAVENOUS ONCE
Status: COMPLETED | OUTPATIENT
Start: 2025-02-02 | End: 2025-02-02

## 2025-02-02 RX ORDER — IOPAMIDOL 755 MG/ML
100 INJECTION, SOLUTION INTRAVASCULAR
Status: COMPLETED | OUTPATIENT
Start: 2025-02-02 | End: 2025-02-02

## 2025-02-02 RX ORDER — METOCLOPRAMIDE HYDROCHLORIDE 5 MG/ML
10 INJECTION INTRAMUSCULAR; INTRAVENOUS ONCE
Status: COMPLETED | OUTPATIENT
Start: 2025-02-02 | End: 2025-02-02

## 2025-02-02 RX ADMIN — IOPAMIDOL 100 ML: 755 INJECTION, SOLUTION INTRAVENOUS at 15:45

## 2025-02-02 RX ADMIN — METOCLOPRAMIDE 10 MG: 5 INJECTION, SOLUTION INTRAMUSCULAR; INTRAVENOUS at 16:14

## 2025-02-02 RX ADMIN — DEXAMETHASONE SODIUM PHOSPHATE 4 MG: 4 INJECTION INTRA-ARTICULAR; INTRALESIONAL; INTRAMUSCULAR; INTRAVENOUS; SOFT TISSUE at 16:16

## 2025-02-02 RX ADMIN — DIPHENHYDRAMINE HYDROCHLORIDE 12.5 MG: 50 INJECTION INTRAMUSCULAR; INTRAVENOUS at 16:09

## 2025-02-02 RX ADMIN — MAGNESIUM SULFATE 1000 MG: 1 INJECTION INTRAVENOUS at 16:23

## 2025-02-02 ASSESSMENT — PAIN DESCRIPTION - LOCATION: LOCATION: HEAD

## 2025-02-02 ASSESSMENT — PAIN - FUNCTIONAL ASSESSMENT: PAIN_FUNCTIONAL_ASSESSMENT: 0-10

## 2025-02-02 ASSESSMENT — PAIN SCALES - GENERAL: PAINLEVEL_OUTOF10: 8

## 2025-02-02 NOTE — ED TRIAGE NOTES
Pt ambulatory to ED w/ c/o headache for a few days. Pt endorses CVA x 6 weeks ago. Pt endorses taking brilinta BID. Pt endorses last dose tylenol 1400.

## 2025-02-02 NOTE — ED NOTES
Patient to CT. Patient reports anterior headache for past few days, but progressively worse over the past few days. Further reports decreased sensation on right face, right lower arm, and right leg. States has has residual decreased sensation of left arm and leg with decreased vision of right eye since previous stroke 6 weeks ago.     Patient is A&O x 4.

## 2025-02-02 NOTE — ED NOTES
Stroke Education provided to patient and spouse/SO and the following topics were discussed    1. Patients personal risk factors for stroke are carotid stenosis, hypertension, and Hx of stroke    2. Warning signs of Stroke:        * Sudden numbness or weakness of the face, arm or leg, especially on one side of          The body            * Sudden confusion, trouble speaking or understanding        * Sudden trouble seeing in one or both eyes        * Sudden trouble walking, dizziness, loss of balance or coordination        * Sudden severe headache with no known cause      3. Importance of activation Emergency Medical Services ( 9-1-1 ) immediately if experience any warning signs of stroke.    4. Be sure and schedule a follow-up appointment with your primary care doctor or any specialists as instructed.     5. You must take medicine every day to treat your risk factors for stroke.  Be sure to take your medicines exactly as your doctor tells you: no more, no less.  Know what your medicines are for , what they do.  Anti-thrombotics /anticoagulants can help prevent strokes.  You are taking the following medicine(s)  Meds to be completed prior to discharge.     6.  Smoking and second-hand smoke greatly increase your risk of stroke, cardiovascular disease and death.    7. Information provided was Verbal Education    8. Documentation of teaching completed in Patient Education Activity and on Care Plan with teaching response noted?  no

## 2025-02-02 NOTE — ED NOTES
Per Toshia ANTONIO last known well is 2200.   Code stroke called at 1530  CT notified: 1530  Teleneuro/ Emergency line called: 1530

## 2025-02-02 NOTE — ED PROVIDER NOTES
[facility] EMERGENCY DEPARTMENT  EMERGENCY DEPARTMENT ENCOUNTER      Pt Name: Zohreh Bajwa  MRN: [id number]  Birthdate 1985  Date of evaluation: 2/2/2025  Provider: Lamar Pope MD    CHIEF COMPLAINT       Chief Complaint   Patient presents with    Headache         HISTORY OF PRESENT ILLNESS   (Location/Symptom, Timing/Onset, Context/Setting, Quality, Duration, Modifying Factors, Severity)  Note limiting factors.   The history is provided by the patient.     39-year-old female with history of anxiety, Crohn's disease, GERD, hyperlipidemia, multiple sclerosis, stroke presenting for headache.  Reports she has had a headache for 2 or 3 days, states that she has had a visual blurry wine that looks like a squiggles in her right visual field that has been there since last night at 10 PM, reports decreased sensation in her right upper and lower extremity that started an hour prior to arrival.  Patient reports a history of stroke, states that her prior stroke symptoms were on her left.  Pt denies weakness in arms or legs. Patient reports no dizziness, reports no slurred speech, no difficulty with walking.    Review of External Medical Records:     12/20/24- hospital visit--Code stroke was called, workup including CT head was negative.  CT angiogram revealed right ICA occlusion versus stenosis with right M2 occlusion.  CT perfusion revealed a large right hemispheric penumbra.  TNK was not given.  Patient was transferred from [facility] ED to [facility] for cerebral angiogram and mechanical thrombectomy with angioplasty and stenting of the right ICA.  During procedure, concern for thrombus accumulation in the stent which required in-stent angioplasty plus heparin and Integrilin.  Repeat angiography showed no interval thrombus reaccumulation.    Nursing Notes were reviewed.      REVIEW OF SYSTEMS    (2-9 systems for level 4, 10 or more for level 5)     Except as noted above the remainder of the review

## 2025-02-02 NOTE — ED NOTES
Patient left AMA. Paperwork given to patient by Dr. Pope and witnessed by Sydney STINSON.     Patient A&O x 4. Denies numbness/tingling prior to departure. States headache now 3/10. NIH: 0 prior to departure. Patient ambulatory from ED to private vehicle with .

## 2025-02-03 LAB
EKG ATRIAL RATE: 78 BPM
EKG DIAGNOSIS: NORMAL
EKG P AXIS: 75 DEGREES
EKG P-R INTERVAL: 144 MS
EKG Q-T INTERVAL: 366 MS
EKG QRS DURATION: 88 MS
EKG QTC CALCULATION (BAZETT): 417 MS
EKG R AXIS: 81 DEGREES
EKG T AXIS: 47 DEGREES
EKG VENTRICULAR RATE: 78 BPM
GLUCOSE BLD STRIP.AUTO-MCNC: 102 MG/DL (ref 65–117)
SERVICE CMNT-IMP: NORMAL

## 2025-02-03 PROCEDURE — 93010 ELECTROCARDIOGRAM REPORT: CPT | Performed by: INTERNAL MEDICINE

## 2025-02-11 ENCOUNTER — TELEPHONE (OUTPATIENT)
Facility: HOSPITAL | Age: 40
End: 2025-02-11

## 2025-02-17 DIAGNOSIS — I65.21 RIGHT INTERNAL CAROTID OCCLUSION: Primary | ICD-10-CM

## 2025-02-17 DIAGNOSIS — I65.21 RIGHT CAROTID ARTERY OCCLUSION: ICD-10-CM

## 2025-02-21 ENCOUNTER — TELEPHONE (OUTPATIENT)
Age: 40
End: 2025-02-21

## 2025-02-21 NOTE — TELEPHONE ENCOUNTER
Left vm for pt to call central scheduling so that she kindra have her imaging done soon.I then asked her to give our office a call back so that we can schedule a f/u appt for her to see the physician to discuss what was seen on her images.

## 2025-02-22 ENCOUNTER — HOSPITAL ENCOUNTER (OUTPATIENT)
Facility: HOSPITAL | Age: 40
Discharge: HOME | End: 2025-02-25
Payer: COMMERCIAL

## 2025-02-22 DIAGNOSIS — Z86.69 H/O: IRITIS: ICD-10-CM

## 2025-02-22 DIAGNOSIS — R20.0 LT ARM NUMBNESS: ICD-10-CM

## 2025-02-22 DIAGNOSIS — G37.3 TRANSVERSE MYELITIS (HCC): ICD-10-CM

## 2025-02-22 DIAGNOSIS — G37.9 DEMYELINATING DISEASE (HCC): ICD-10-CM

## 2025-02-22 DIAGNOSIS — R26.2 DIFFICULTY WALKING: ICD-10-CM

## 2025-02-22 PROCEDURE — A9579 GAD-BASE MR CONTRAST NOS,1ML: HCPCS | Performed by: STUDENT IN AN ORGANIZED HEALTH CARE EDUCATION/TRAINING PROGRAM

## 2025-02-22 PROCEDURE — 72156 MRI NECK SPINE W/O & W/DYE: CPT

## 2025-02-22 PROCEDURE — 6360000004 HC RX CONTRAST MEDICATION: Performed by: STUDENT IN AN ORGANIZED HEALTH CARE EDUCATION/TRAINING PROGRAM

## 2025-02-22 PROCEDURE — 72157 MRI CHEST SPINE W/O & W/DYE: CPT

## 2025-02-22 PROCEDURE — 70553 MRI BRAIN STEM W/O & W/DYE: CPT

## 2025-02-22 RX ADMIN — GADOTERIDOL 14 ML: 279.3 INJECTION, SOLUTION INTRAVENOUS at 14:03

## 2025-03-11 ENCOUNTER — TELEMEDICINE (OUTPATIENT)
Age: 40
End: 2025-03-11
Payer: COMMERCIAL

## 2025-03-11 DIAGNOSIS — Z86.73 HISTORY OF STROKE: ICD-10-CM

## 2025-03-11 PROBLEM — I63.511 CEREBROVASCULAR ACCIDENT (CVA) DUE TO OCCLUSION OF RIGHT MIDDLE CEREBRAL ARTERY (HCC): Status: RESOLVED | Noted: 2024-12-20 | Resolved: 2025-03-11

## 2025-03-11 PROBLEM — I63.411 CEREBROVASCULAR ACCIDENT (CVA) DUE TO EMBOLISM OF RIGHT MIDDLE CEREBRAL ARTERY (HCC): Status: RESOLVED | Noted: 2024-12-20 | Resolved: 2025-03-11

## 2025-03-11 PROBLEM — I63.9 ISCHEMIC STROKE (HCC): Status: RESOLVED | Noted: 2024-12-20 | Resolved: 2025-03-11

## 2025-03-11 PROCEDURE — 99214 OFFICE O/P EST MOD 30 MIN: CPT | Performed by: NURSE PRACTITIONER

## 2025-03-11 RX ORDER — ACETAZOLAMIDE 500 MG/1
500 CAPSULE, EXTENDED RELEASE ORAL DAILY
COMMUNITY
Start: 2025-03-03

## 2025-03-11 RX ORDER — ERGOCALCIFEROL 1.25 MG/1
50000 CAPSULE, LIQUID FILLED ORAL WEEKLY
COMMUNITY
Start: 2025-03-05

## 2025-03-11 NOTE — PROGRESS NOTES
[facility]  [facility]  [address]  [address]  [facility]  [address]  [phone] (phone)   [phone] (fax)  Hospital Follow-up / Tele-health Visit      Date:  25     Name:  ZOHREH CRUZ  :  1985  MRN:  [id number]     PCP:  Camden Genao MD    Zohreh Cruz is a 39 y.o. female who was seen by synchronous (real-time) audio-video technology on 3/11/2025 for Follow-Up from Hospital and Stroke    Assessment & Plan:   1. History of stroke  Assessment & Plan:  Patient with history of anxiety, Crohn's disease, underlying demyelinating disease , history of meningitis , smoking (1 PPD) GERD, hyperlipidemia, multiple sclerosis, history of stroke with recurrent right hemispheric stroke (2024) in the setting of right ICA occlusion status post right ICA stent (2024)    Continue aspirin 81 mg, Brilinta 90 mg twice daily.  Patient has repeat imaging and follow-up with the neurointerventional team within the next month.  Patient is currently on a statin vacation due to myalgias.  She will restart her Lipitor in a couple weeks time.  I advised her to take co-Q10 with that.  If she continues to have myalgias she may need to be placed on Repatha or Praluent.  With regards to underlying demyelinating disease, patient is following with neurology in the [place] region.  She has undergone repeat imaging of the brain T and C-spine.  She will have appointment to discuss DMT with her neurologist in [place]  Patient was having headaches she was evaluated by Dr. Perez with neuro-ophthalmology.  She was found to have some voids in her right visual fields, optic nerve edema, macular edema.  She has been placed on Diamox 500 mg daily.  She will continue to have follow-up with Dr. Perez.  She has been worked up by heme-onc for underlying hypercoagulable condition.  She will follow-up with heme-onc as indicated    We discussed follow-up.  She is working with the

## 2025-03-11 NOTE — ASSESSMENT & PLAN NOTE
Patient with history of anxiety, Crohn's disease, underlying demyelinating disease , history of meningitis , smoking (1 PPD) GERD, hyperlipidemia, multiple sclerosis, history of stroke with recurrent right hemispheric stroke (12/2024) in the setting of right ICA occlusion status post right ICA stent (12/2024)    Continue aspirin 81 mg, Brilinta 90 mg twice daily.  Patient has repeat imaging and follow-up with the neurointerventional team within the next month.  Patient is currently on a statin vacation due to myalgias.  She will restart her Lipitor in a couple weeks time.  I advised her to take co-Q10 with that.  If she continues to have myalgias she may need to be placed on Repatha or Praluent.  With regards to underlying demyelinating disease, patient is following with neurology in the [facility].  She has undergone repeat imaging of the brain T and C-spine.  She will have appointment to discuss DMT with her neurologist in [place]  Patient was having headaches she was evaluated by Dr. Perez with neuro-ophthalmology.  She was found to have some voids in her right visual fields, optic nerve edema, macular edema.  She has been placed on Diamox 500 mg daily.  She will continue to have follow-up with Dr. Perez.  She has been worked up by heme-onc for underlying hypercoagulable condition.  She will follow-up with heme-onc as indicated    We discussed follow-up.  She is working with the appropriate subspecialist at this time there is not an indication to continue following with neuro telehealth.  But she will reach out if issues or concerns arise and she needs evaluation.

## 2025-03-12 ENCOUNTER — TELEPHONE (OUTPATIENT)
Age: 40
End: 2025-03-12

## 2025-03-12 NOTE — TELEPHONE ENCOUNTER
SUKHWINDERM requesting a callback to schedule aVirtual imaging follow up appointment.  I let the patient know that Dr. Lane/Funmi's next available would be in May.

## 2025-03-17 ENCOUNTER — HOSPITAL ENCOUNTER (OUTPATIENT)
Facility: HOSPITAL | Age: 40
Discharge: HOME | End: 2025-03-19
Payer: COMMERCIAL

## 2025-03-17 DIAGNOSIS — I65.23 BILATERAL CAROTID ARTERY STENOSIS: ICD-10-CM

## 2025-03-17 PROCEDURE — 93880 EXTRACRANIAL BILAT STUDY: CPT

## 2025-03-19 LAB
VAS LEFT CCA DIST EDV: 33.2 CM/S
VAS LEFT CCA DIST PSV: 92.9 CM/S
VAS LEFT CCA PROX EDV: 26.5 CM/S
VAS LEFT CCA PROX PSV: 110 CM/S
VAS LEFT ECA EDV: 18 CM/S
VAS LEFT ECA PSV: 106 CM/S
VAS LEFT ICA DIST EDV: 42 CM/S
VAS LEFT ICA DIST PSV: 119 CM/S
VAS LEFT ICA PROX EDV: 50.4 CM/S
VAS LEFT ICA PROX PSV: 140 CM/S
VAS LEFT ICA/CCA PSV: 1.51
VAS LEFT SUBCLAVIAN MID EDV: 0 CM/S
VAS LEFT SUBCLAVIAN MID PSV: 109 CM/S
VAS LEFT SUBCLAVIAN PROX EDV: 0 CM/S
VAS LEFT SUBCLAVIAN PROX PSV: 109 CM/S
VAS LEFT VERTEBRAL EDV: 16.3 CM/S
VAS LEFT VERTEBRAL PSV: 48.9 CM/S
VAS RIGHT CCA DIST EDV: 22.7 CM/S
VAS RIGHT CCA DIST PSV: 77.7 CM/S
VAS RIGHT CCA PROX EDV: 26.5 CM/S
VAS RIGHT CCA PROX PSV: 93.8 CM/S
VAS RIGHT DIST OUTFLOW EDV: 30 CM/S
VAS RIGHT DIST OUTFLOW PSV: 79.7 CM/S
VAS RIGHT ECA EDV: 69.9 CM/S
VAS RIGHT ECA PSV: 288 CM/S
VAS RIGHT GRAFT 1: NORMAL
VAS RIGHT ICA DIST EDV: 29.2 CM/S
VAS RIGHT ICA DIST PSV: 86.6 CM/S
VAS RIGHT ICA PROX EDV: 33.4 CM/S
VAS RIGHT ICA PROX PSV: 79.7 CM/S
VAS RIGHT ICA/CCA PSV: 1.03
VAS RIGHT INFLOW ARTERY EDV: 34.1 CM/S
VAS RIGHT INFLOW ARTERY PSV: 88.1 CM/S
VAS RIGHT MID OUTFLOW EDV: 26.6 CM/S
VAS RIGHT MID OUTFLOW PSV: 72.9 CM/S
VAS RIGHT OUTFLOW VESSEL EDV: 29.2 CM/S
VAS RIGHT OUTFLOW VESSEL PSV: 86.6 CM/S
VAS RIGHT PROX OUTFLOW EDV: 1.3 CM/S
VAS RIGHT PROX OUTFLOW PSV: 80.6 CM/S
VAS RIGHT SUBCLAVIAN MID EDV: 0 CM/S
VAS RIGHT SUBCLAVIAN MID PSV: 121 CM/S
VAS RIGHT SUBCLAVIAN PROX EDV: 0 CM/S
VAS RIGHT SUBCLAVIAN PROX PSV: 121 CM/S
VAS RIGHT VERTEBRAL EDV: 23.7 CM/S
VAS RIGHT VERTEBRAL PSV: 68.2 CM/S

## 2025-03-29 PROBLEM — I65.23 BILATERAL CAROTID ARTERY STENOSIS: Status: ACTIVE | Noted: 2025-03-29

## 2025-04-20 DIAGNOSIS — I65.21 RIGHT INTERNAL CAROTID OCCLUSION: ICD-10-CM

## 2025-05-16 ENCOUNTER — TELEPHONE (OUTPATIENT)
Age: 40
End: 2025-05-16

## 2025-05-19 ENCOUNTER — TELEMEDICINE (OUTPATIENT)
Age: 40
End: 2025-05-19
Payer: COMMERCIAL

## 2025-05-19 DIAGNOSIS — I65.21 INTERNAL CAROTID ARTERY OCCLUSION, RIGHT: Primary | ICD-10-CM

## 2025-05-19 PROCEDURE — 99213 OFFICE O/P EST LOW 20 MIN: CPT | Performed by: NURSE PRACTITIONER

## 2025-05-19 NOTE — PROGRESS NOTES
Phone call to patient in preparation for Virtual/phone visit with provider.  Name and  verified.  Follow up for Carotid artery stenosis and imaging review.  Reports intermittent numbness on her left side is unchanged.  No new symptoms reported.

## 2025-05-19 NOTE — PATIENT INSTRUCTIONS
Follow up phone call in June 2025 after Carotid duplex imaging is completed.  See attached paperwork to schedule imaging.  Stop Brilinta.

## 2025-05-19 NOTE — PROGRESS NOTES
Virtual Clinic Note    Zohreh Bajwa is a 39 y.o. female established patient who was seen by synchronous (real-time) audio-video technology on 5/19/2025.      Consent: Zohreh Bajwa, who was seen by synchronous (real-time) audio-video technology, and/or her healthcare decision maker, is aware that this patient-initiated, Telehealth encounter on 5/19/2025 is a billable service, with coverage as determined by her insurance carrier. She is aware that she may receive a bill and has provided verbal consent to proceed: Yes    Assessment & Plan:     We reviewed the results of her most recent CUS. Her stent is patent, no evidence of stenosis.     We will obtain a duplex in June (6 months post op) and have phone call follow up.     Reviewed today with the patient:   - BEFAST, call 911 if symptoms present   - Strict BP control (less than 140/90 long term)   - Stop Brilinta today, will be maintained on ASA monotherapy    I have spent at least 20 minutes reviewing previous notes, test results and face to face (virtual) with the patient discussing the diagnosis and importance of compliance with the treatment plan as well as documenting on the day of the visit.    Subjective:   Zohreh Bajwa is a 39 y.o. female new patient with history of GERD, Crohns disease, HLD, Meningitis, PUD, demyelinating disease being worked up for multiple sclerosis by outpatient Neurology who was admitted to [facility] due to acute Ischemic CVA due to right ICA and right MCA occlusion s/p mechanical thrombectomy + right PERCY on 12/20/2024 by Dr Lane.     She presents today for clinic follow up. She reports she has been doing well, does still experience some intermittent left sided numbness. She has seen ophthalmology (Dr Perez) and reports she was diagnosed with IIHT and put on Diamox. She reports her BP is well controlled and reports she has been compliant with DAPT. She takes her statin every other day due to myalgias.     Chief Complaint:

## 2025-06-19 ENCOUNTER — HOSPITAL ENCOUNTER (OUTPATIENT)
Facility: HOSPITAL | Age: 40
Discharge: HOME | End: 2025-06-21
Payer: COMMERCIAL

## 2025-06-19 DIAGNOSIS — I65.21 INTERNAL CAROTID ARTERY OCCLUSION, RIGHT: ICD-10-CM

## 2025-06-19 PROCEDURE — 93880 EXTRACRANIAL BILAT STUDY: CPT

## 2025-06-20 LAB
VAS LEFT ARM BP DIA: 73 MMHG
VAS LEFT ARM BP: 113 MMHG
VAS LEFT CCA DIST EDV: 32.8 CM/S
VAS LEFT CCA DIST PSV: 108 CM/S
VAS LEFT CCA PROX EDV: 36 CM/S
VAS LEFT CCA PROX PSV: 135 CM/S
VAS LEFT ECA EDV: 20.1 CM/S
VAS LEFT ECA PSV: 127 CM/S
VAS LEFT ICA DIST EDV: 42 CM/S
VAS LEFT ICA DIST PSV: 122 CM/S
VAS LEFT ICA PROX EDV: 52.5 CM/S
VAS LEFT ICA PROX PSV: 152 CM/S
VAS LEFT ICA/CCA PSV: 1.41
VAS LEFT SUBCLAVIAN PROX EDV: 0 CM/S
VAS LEFT SUBCLAVIAN PROX PSV: 126 CM/S
VAS LEFT VERTEBRAL EDV: 16.1 CM/S
VAS LEFT VERTEBRAL PSV: 58.2 CM/S
VAS RIGHT ARM BP DIA: 67 MMHG
VAS RIGHT ARM BP: 114 MMHG
VAS RIGHT CCA DIST EDV: 39.6 CM/S
VAS RIGHT CCA DIST PSV: 97.2 CM/S
VAS RIGHT CCA PROX EDV: 32.4 CM/S
VAS RIGHT CCA PROX PSV: 91.2 CM/S
VAS RIGHT ECA EDV: 56.6 CM/S
VAS RIGHT ECA PSV: 226 CM/S
VAS RIGHT ICA DIST EDV: 40.4 CM/S
VAS RIGHT ICA DIST PSV: 96.6 CM/S
VAS RIGHT ICA PROX EDV: 28.4 CM/S
VAS RIGHT ICA PROX PSV: 91 CM/S
VAS RIGHT ICA/CCA PSV: 0.94
VAS RIGHT SUBCLAVIAN PROX EDV: 0 CM/S
VAS RIGHT SUBCLAVIAN PROX PSV: 136 CM/S
VAS RIGHT VERTEBRAL EDV: 18 CM/S
VAS RIGHT VERTEBRAL PSV: 65.7 CM/S

## 2025-06-20 PROCEDURE — 93880 EXTRACRANIAL BILAT STUDY: CPT | Performed by: SURGERY

## (undated) DEVICE — 3M™ MEDIPORE™ + PAD 3573: Brand: 3M™ MEDIPORE™

## (undated) DEVICE — SUT ETHLN 4-0 18IN FS2 BLK --

## (undated) DEVICE — TURNOVER KIT OR CUST CMB FLD GEN LF

## (undated) DEVICE — SPONGE GZ W4XL4IN COT 12 PLY TYP VII WVN C FLD DSGN

## (undated) DEVICE — HYPODERMIC SAFETY NEEDLE: Brand: MAGELLAN

## (undated) DEVICE — SUT ETHLN 4-0 18IN P3 GRN --

## (undated) DEVICE — DRESSING,GAUZE,XEROFORM,CURAD,1"X8",ST: Brand: CURAD

## (undated) DEVICE — TOWEL,OR,DSP,ST,BLUE,STD,4/PK,20PK/CS: Brand: MEDLINE

## (undated) DEVICE — GARMENT CMPR STD UNV CALF FLWT -- RN VENTILATE NS DISP 17- IN

## (undated) DEVICE — INTENDED FOR TISSUE SEPARATION, AND OTHER PROCEDURES THAT REQUIRE A SHARP SURGICAL BLADE TO PUNCTURE OR CUT.: Brand: BARD-PARKER SAFETY BLADES SIZE 15, STERILE

## (undated) DEVICE — BASIC SINGLE BASIN-LF: Brand: MEDLINE INDUSTRIES, INC.

## (undated) DEVICE — SOL IRR SOD CL 0.9% 1000ML BTL --

## (undated) DEVICE — SYR 20ML LL STRL LF --

## (undated) DEVICE — PAD,PREPPING,CUFFED,24X48,7",NONSTERILE: Brand: MEDLINE

## (undated) DEVICE — WET SKIN PREP TRAY: Brand: MEDLINE INDUSTRIES, INC.

## (undated) DEVICE — T-DRAPE,EXTREMITY,STERILE: Brand: MEDLINE

## (undated) DEVICE — STERILE POLYISOPRENE POWDER-FREE SURGICAL GLOVES: Brand: PROTEXIS

## (undated) DEVICE — MINOR GENERAL PACK: Brand: MEDLINE INDUSTRIES, INC.

## (undated) DEVICE — SUTURE VCRL SZ 3-0 L27IN ABSRB UD L26MM SH 1/2 CIR J416H

## (undated) DEVICE — DRAPE,REIN 53X77,STERILE: Brand: MEDLINE